# Patient Record
Sex: FEMALE | Race: WHITE | Employment: FULL TIME | URBAN - METROPOLITAN AREA
[De-identification: names, ages, dates, MRNs, and addresses within clinical notes are randomized per-mention and may not be internally consistent; named-entity substitution may affect disease eponyms.]

---

## 2018-12-07 PROBLEM — E22.1 HYPERPROLACTINEMIA (HCC): Status: ACTIVE | Noted: 2018-12-07

## 2019-08-28 PROBLEM — E66.01 OBESITY, MORBID (HCC): Status: ACTIVE | Noted: 2019-08-28

## 2021-04-08 ENCOUNTER — HOSPITAL ENCOUNTER (OUTPATIENT)
Dept: MAMMOGRAPHY | Age: 31
Discharge: HOME | End: 2021-04-08
Attending: OBSTETRICS & GYNECOLOGY
Payer: COMMERCIAL

## 2021-04-08 DIAGNOSIS — N63.21 MASS OF UPPER OUTER QUADRANT OF LEFT BREAST: ICD-10-CM

## 2021-04-08 PROCEDURE — 77066 DX MAMMO INCL CAD BI: CPT

## 2021-04-08 PROCEDURE — 76642 ULTRASOUND BREAST LIMITED: CPT

## 2021-07-12 ENCOUNTER — HOSPITAL ENCOUNTER (OUTPATIENT)
Dept: MAMMOGRAPHY | Age: 31
Discharge: HOME | End: 2021-07-12
Attending: OBSTETRICS & GYNECOLOGY
Payer: COMMERCIAL

## 2021-07-12 DIAGNOSIS — N63.21 MASS OF UPPER OUTER QUADRANT OF LEFT BREAST: ICD-10-CM

## 2021-07-12 PROCEDURE — 76642 ULTRASOUND BREAST LIMITED: CPT

## 2022-03-18 PROBLEM — E66.01 OBESITY, MORBID (HCC): Status: ACTIVE | Noted: 2019-08-28

## 2022-03-19 PROBLEM — E22.1 HYPERPROLACTINEMIA (HCC): Status: ACTIVE | Noted: 2018-12-07

## 2022-03-25 VITALS — BODY MASS INDEX: 38.41 KG/M2 | WEIGHT: 225 LBS | HEIGHT: 64 IN

## 2022-03-25 RX ORDER — PANTOPRAZOLE SODIUM 40 MG/1
40 TABLET, DELAYED RELEASE ORAL
COMMUNITY

## 2022-03-25 NOTE — PERIOP NOTES
Enhanced Recovery After GYN Surgery: non-diabetic patients    It is highly recommended you purchase and drink Ensure Complete - one bottle twice daily for five days starting on 04/09/2022. Ensure Complete is the preferred formula over other Ensure formulas. It is recommended that you continue drinking this for one month after surgery. The night before surgery 04/13/2022, drink 2 bottles of the Ensure Pre-Surgery drink. The morning of surgery 04/14/2022, drink one bottle of the Ensure Pre-Surgery drink while on your way to the hospital. Drink this over 5-10 minutes. Drink nothing else after drinking the pre-surgical drink the morning of surgery. Bring your patient handbook with you to the hospital.    Things to remember:    1. You will be given clear liquids to drink, advancing diet as tolerated    2. You will be up and moving around with assistance 2-4 hours after surgery. 3. You will be given regularly scheduled pain medications (NSAIDS, Tylenol, Gabapentin) with narcotics as needed. 4. You may be able to go home that night if the surgeon okays and you are up and eating and drinking. Otherwise, your discharge will be the following morning around lunch time. 5. Continue drinking Ensure Complete for 5 days after surgery.

## 2022-03-25 NOTE — PERIOP NOTES
Patient verified name and     Order for consent not found in EHR and matches case posting; patient verified. Type 2 surgery    Labs per surgeon: None; orders not received. Labs per anesthesia protocol: Hgb  EKG: Not required per anesthesia guidelines      Patient informed of GYN class on 2022 @ 0830 at which time labs will be drawn. Patient will also receive all patient education and hospital approved surgical skin cleanser to use per hospital policy. Patient instructed to hold all vitamins 7 days prior to surgery and NSAIDS 5 days prior to surgery, patient verbalized understanding. Patient instructed to continue previous medications as prescribed prior to surgery and to take the following medications the day of surgery according to anesthesia guidelines with a small sip of water: None. Patient answered medical/surgical history questions at their best of ability. All prior to admission medications documented in [facility].

## 2022-03-31 ENCOUNTER — HOSPITAL ENCOUNTER (OUTPATIENT)
Dept: SURGERY | Age: 32
Discharge: HOME | End: 2022-03-31

## 2022-04-05 NOTE — PROGRESS NOTES
Enhanced Recovery After GYN Surgery: non-diabetic patients    Drink Ensure COMPLETE - one bottle twice daily for five days starting on 4/8/22 and stopping on 4/12/22. Do not drink any Ensure COMPLETE the day before surgery 4/13/22. The night before surgery 4/13/22, drink 2 bottles of the Ensure Pre-Surgery drink. The morning of surgery 4/14/22, drink one bottle of the Ensure Pre-Surgery drink while on  your way to the hospital. Drink this over 5-10 minutes. Drink nothing else after drinking the pre-surgical drink the morning of surgery. Bring your patient handbook with you to the hospital.    Things to remember:    1. You will be given clear liquids to drink, advancing diet as tolerated    2. You will be up and moving around with assistance 2-4 hours after surgery. 3. You will be given regularly scheduled pain medications (NSAIDS, Tylenol, Gabapentin) with narcotics as needed. 4. You may be able to go home that night if the surgeon okays and you are up and eating and drinking. Otherwise, your discharge will be the following morning around lunch time. 5. Continue drinking Ensure COMPLETE for 5 days after surgery.

## 2022-04-06 ENCOUNTER — HOSPITAL ENCOUNTER (OUTPATIENT)
Dept: SURGERY | Age: 32
Discharge: HOME | End: 2022-04-06
Attending: OBSTETRICS & GYNECOLOGY
Payer: COMMERCIAL

## 2022-04-06 LAB — HGB BLD-MCNC: 13 G/DL (ref 11.7–15.4)

## 2022-04-06 PROCEDURE — 85018 HEMOGLOBIN: CPT

## 2022-04-06 PROCEDURE — 36415 COLL VENOUS BLD VENIPUNCTURE: CPT

## 2022-04-06 NOTE — PROGRESS NOTES
Patient attended [facility]/ GYN surgery orientation class today. Detailed instruction book regarding GYN surgery was provided at start of class. Class content included pre-operative instructions for surgery in the week prior to and day before surgery. Packet including Hibiclens and printed instructions on bathing was provided to patient. Detailed and printed diet instruction and presurgical drinks were also given to patient  in accordance with [facility] protocol. Detailed information was given regarding arriving at the hospital and instructions for the patient's day of surgery. Discussed recovery from surgery, hospital stay, pain management, and discharge. Reviewed recovery at home including pelvic rest, driving and activity restrictions, issues requiring call to physician etc. Payton Ramos all questions in detail. Patient voices understanding of all.

## 2022-04-13 ENCOUNTER — ANESTHESIA EVENT (OUTPATIENT)
Dept: SURGERY | Age: 32
End: 2022-04-13
Payer: COMMERCIAL

## 2022-04-14 ENCOUNTER — ANESTHESIA (OUTPATIENT)
Dept: SURGERY | Age: 32
End: 2022-04-14
Payer: COMMERCIAL

## 2022-04-14 ENCOUNTER — HOSPITAL ENCOUNTER (OUTPATIENT)
Age: 32
Discharge: HOME | End: 2022-04-14
Attending: OBSTETRICS & GYNECOLOGY | Admitting: OBSTETRICS & GYNECOLOGY
Payer: COMMERCIAL

## 2022-04-14 VITALS
HEART RATE: 86 BPM | BODY MASS INDEX: 39.61 KG/M2 | TEMPERATURE: 98.6 F | HEIGHT: 64 IN | DIASTOLIC BLOOD PRESSURE: 76 MMHG | WEIGHT: 232 LBS | RESPIRATION RATE: 16 BRPM | OXYGEN SATURATION: 95 % | SYSTOLIC BLOOD PRESSURE: 115 MMHG

## 2022-04-14 PROBLEM — E28.2 PCOS (POLYCYSTIC OVARIAN SYNDROME): Status: ACTIVE | Noted: 2022-04-14

## 2022-04-14 PROBLEM — R10.2 PELVIC PAIN: Status: ACTIVE | Noted: 2022-04-14

## 2022-04-14 LAB
ABO + RH BLD: NORMAL
BLOOD GROUP ANTIBODIES SERPL: NORMAL
HCG UR QL: NEGATIVE
SPECIMEN EXP DATE BLD: NORMAL

## 2022-04-14 PROCEDURE — 76010000171 HC OR TIME 2 TO 2.5 HR INTENSV-TIER 1: Performed by: OBSTETRICS & GYNECOLOGY

## 2022-04-14 PROCEDURE — 74011250636 HC RX REV CODE- 250/636: Performed by: OBSTETRICS & GYNECOLOGY

## 2022-04-14 PROCEDURE — 74011000250 HC RX REV CODE- 250: Performed by: OBSTETRICS & GYNECOLOGY

## 2022-04-14 PROCEDURE — 58552 LAPARO-VAG HYST INCL T/O: CPT | Performed by: OBSTETRICS & GYNECOLOGY

## 2022-04-14 PROCEDURE — 77030040830 HC CATH URETH FOL MDII -A: Performed by: OBSTETRICS & GYNECOLOGY

## 2022-04-14 PROCEDURE — 77030039425 HC BLD LARYNG TRULITE DISP TELE -A: Performed by: ANESTHESIOLOGY

## 2022-04-14 PROCEDURE — 77030008771 HC TU NG SALEM SUMP -A: Performed by: ANESTHESIOLOGY

## 2022-04-14 PROCEDURE — 81025 URINE PREGNANCY TEST: CPT

## 2022-04-14 PROCEDURE — 77030018836 HC SOL IRR NACL ICUM -A: Performed by: OBSTETRICS & GYNECOLOGY

## 2022-04-14 PROCEDURE — 74011250636 HC RX REV CODE- 250/636: Performed by: ANESTHESIOLOGY

## 2022-04-14 PROCEDURE — 77030039147 HC PWDR HEMSTS SURGICEL JNJ -D: Performed by: OBSTETRICS & GYNECOLOGY

## 2022-04-14 PROCEDURE — 86900 BLOOD TYPING SEROLOGIC ABO: CPT

## 2022-04-14 PROCEDURE — 74011250636 HC RX REV CODE- 250/636: Performed by: NURSE ANESTHETIST, CERTIFIED REGISTERED

## 2022-04-14 PROCEDURE — 77030040922 HC BLNKT HYPOTHRM STRY -A: Performed by: ANESTHESIOLOGY

## 2022-04-14 PROCEDURE — 77030020829: Performed by: OBSTETRICS & GYNECOLOGY

## 2022-04-14 PROCEDURE — 77030040361 HC SLV COMPR DVT MDII -B: Performed by: OBSTETRICS & GYNECOLOGY

## 2022-04-14 PROCEDURE — 77030018720 HC DVC LIGSR ATLS COVD -E: Performed by: OBSTETRICS & GYNECOLOGY

## 2022-04-14 PROCEDURE — 77030003578 HC NDL INSUF VERES AMR -B: Performed by: OBSTETRICS & GYNECOLOGY

## 2022-04-14 PROCEDURE — 76060000035 HC ANESTHESIA 2 TO 2.5 HR: Performed by: OBSTETRICS & GYNECOLOGY

## 2022-04-14 PROCEDURE — 77030008756 HC TU IRR SUC STRY -B: Performed by: OBSTETRICS & GYNECOLOGY

## 2022-04-14 PROCEDURE — 77030019908 HC STETH ESOPH SIMS -A: Performed by: ANESTHESIOLOGY

## 2022-04-14 PROCEDURE — 77030003029 HC SUT VCRL J&J -B: Performed by: OBSTETRICS & GYNECOLOGY

## 2022-04-14 PROCEDURE — 74011000250 HC RX REV CODE- 250: Performed by: NURSE ANESTHETIST, CERTIFIED REGISTERED

## 2022-04-14 PROCEDURE — 74011250637 HC RX REV CODE- 250/637: Performed by: ANESTHESIOLOGY

## 2022-04-14 PROCEDURE — 77030008606 HC TRCR ENDOSC KII AMR -B: Performed by: OBSTETRICS & GYNECOLOGY

## 2022-04-14 PROCEDURE — 77030037088 HC TUBE ENDOTRACH ORAL NSL COVD-A: Performed by: ANESTHESIOLOGY

## 2022-04-14 PROCEDURE — 77030002933 HC SUT MCRYL J&J -A: Performed by: OBSTETRICS & GYNECOLOGY

## 2022-04-14 PROCEDURE — 76210000006 HC OR PH I REC 0.5 TO 1 HR: Performed by: OBSTETRICS & GYNECOLOGY

## 2022-04-14 PROCEDURE — 74011250637 HC RX REV CODE- 250/637: Performed by: OBSTETRICS & GYNECOLOGY

## 2022-04-14 PROCEDURE — 77030010507 HC ADH SKN DERMBND J&J -B: Performed by: OBSTETRICS & GYNECOLOGY

## 2022-04-14 PROCEDURE — 2709999900 HC NON-CHARGEABLE SUPPLY: Performed by: OBSTETRICS & GYNECOLOGY

## 2022-04-14 PROCEDURE — 77030012770 HC TRCR OPT FX AMR -B: Performed by: OBSTETRICS & GYNECOLOGY

## 2022-04-14 PROCEDURE — 88307 TISSUE EXAM BY PATHOLOGIST: CPT

## 2022-04-14 PROCEDURE — 77030011502 HC MANIP UTER ZUM ZINN -B: Performed by: OBSTETRICS & GYNECOLOGY

## 2022-04-14 PROCEDURE — 77030031139 HC SUT VCRL2 J&J -A: Performed by: OBSTETRICS & GYNECOLOGY

## 2022-04-14 RX ORDER — KETOROLAC TROMETHAMINE 30 MG/ML
30 INJECTION, SOLUTION INTRAMUSCULAR; INTRAVENOUS
Status: DISCONTINUED | OUTPATIENT
Start: 2022-04-14 | End: 2022-04-14 | Stop reason: HOSPADM

## 2022-04-14 RX ORDER — CEFAZOLIN SODIUM/WATER 2 G/20 ML
2 SYRINGE (ML) INTRAVENOUS ONCE
Status: COMPLETED | OUTPATIENT
Start: 2022-04-14 | End: 2022-04-14

## 2022-04-14 RX ORDER — BUPIVACAINE HYDROCHLORIDE 2.5 MG/ML
INJECTION, SOLUTION EPIDURAL; INFILTRATION; INTRACAUDAL AS NEEDED
Status: DISCONTINUED | OUTPATIENT
Start: 2022-04-14 | End: 2022-04-14 | Stop reason: HOSPADM

## 2022-04-14 RX ORDER — DIPHENHYDRAMINE HYDROCHLORIDE 50 MG/ML
12.5 INJECTION, SOLUTION INTRAMUSCULAR; INTRAVENOUS ONCE
Status: DISCONTINUED | OUTPATIENT
Start: 2022-04-14 | End: 2022-04-14 | Stop reason: HOSPADM

## 2022-04-14 RX ORDER — SODIUM CHLORIDE, SODIUM LACTATE, POTASSIUM CHLORIDE, CALCIUM CHLORIDE 600; 310; 30; 20 MG/100ML; MG/100ML; MG/100ML; MG/100ML
100 INJECTION, SOLUTION INTRAVENOUS CONTINUOUS
Status: DISCONTINUED | OUTPATIENT
Start: 2022-04-14 | End: 2022-04-14 | Stop reason: HOSPADM

## 2022-04-14 RX ORDER — ONDANSETRON 4 MG/1
4 TABLET, ORALLY DISINTEGRATING ORAL
Status: DISCONTINUED | OUTPATIENT
Start: 2022-04-14 | End: 2022-04-14 | Stop reason: HOSPADM

## 2022-04-14 RX ORDER — LIDOCAINE HYDROCHLORIDE 20 MG/ML
INJECTION, SOLUTION EPIDURAL; INFILTRATION; INTRACAUDAL; PERINEURAL AS NEEDED
Status: DISCONTINUED | OUTPATIENT
Start: 2022-04-14 | End: 2022-04-14 | Stop reason: HOSPADM

## 2022-04-14 RX ORDER — KETAMINE HYDROCHLORIDE 50 MG/ML
INJECTION, SOLUTION INTRAMUSCULAR; INTRAVENOUS AS NEEDED
Status: DISCONTINUED | OUTPATIENT
Start: 2022-04-14 | End: 2022-04-14 | Stop reason: HOSPADM

## 2022-04-14 RX ORDER — SODIUM CHLORIDE 0.9 % (FLUSH) 0.9 %
5-40 SYRINGE (ML) INJECTION EVERY 8 HOURS
Status: DISCONTINUED | OUTPATIENT
Start: 2022-04-14 | End: 2022-04-14 | Stop reason: HOSPADM

## 2022-04-14 RX ORDER — ESMOLOL HYDROCHLORIDE 10 MG/ML
INJECTION INTRAVENOUS AS NEEDED
Status: DISCONTINUED | OUTPATIENT
Start: 2022-04-14 | End: 2022-04-14 | Stop reason: HOSPADM

## 2022-04-14 RX ORDER — FENTANYL CITRATE 50 UG/ML
INJECTION, SOLUTION INTRAMUSCULAR; INTRAVENOUS AS NEEDED
Status: DISCONTINUED | OUTPATIENT
Start: 2022-04-14 | End: 2022-04-14 | Stop reason: HOSPADM

## 2022-04-14 RX ORDER — ONDANSETRON 2 MG/ML
4 INJECTION INTRAMUSCULAR; INTRAVENOUS ONCE
Status: COMPLETED | OUTPATIENT
Start: 2022-04-14 | End: 2022-04-14

## 2022-04-14 RX ORDER — MIDAZOLAM HYDROCHLORIDE 1 MG/ML
2 INJECTION, SOLUTION INTRAMUSCULAR; INTRAVENOUS
Status: COMPLETED | OUTPATIENT
Start: 2022-04-14 | End: 2022-04-14

## 2022-04-14 RX ORDER — NALOXONE HYDROCHLORIDE 0.4 MG/ML
0.1 INJECTION, SOLUTION INTRAMUSCULAR; INTRAVENOUS; SUBCUTANEOUS AS NEEDED
Status: DISCONTINUED | OUTPATIENT
Start: 2022-04-14 | End: 2022-04-14 | Stop reason: HOSPADM

## 2022-04-14 RX ORDER — DEXAMETHASONE SODIUM PHOSPHATE 4 MG/ML
INJECTION, SOLUTION INTRA-ARTICULAR; INTRALESIONAL; INTRAMUSCULAR; INTRAVENOUS; SOFT TISSUE AS NEEDED
Status: DISCONTINUED | OUTPATIENT
Start: 2022-04-14 | End: 2022-04-14 | Stop reason: HOSPADM

## 2022-04-14 RX ORDER — GLYCOPYRROLATE 0.2 MG/ML
INJECTION INTRAMUSCULAR; INTRAVENOUS AS NEEDED
Status: DISCONTINUED | OUTPATIENT
Start: 2022-04-14 | End: 2022-04-14 | Stop reason: HOSPADM

## 2022-04-14 RX ORDER — ONDANSETRON 2 MG/ML
INJECTION INTRAMUSCULAR; INTRAVENOUS AS NEEDED
Status: DISCONTINUED | OUTPATIENT
Start: 2022-04-14 | End: 2022-04-14 | Stop reason: HOSPADM

## 2022-04-14 RX ORDER — FENTANYL CITRATE 50 UG/ML
100 INJECTION, SOLUTION INTRAMUSCULAR; INTRAVENOUS ONCE
Status: DISCONTINUED | OUTPATIENT
Start: 2022-04-14 | End: 2022-04-14 | Stop reason: HOSPADM

## 2022-04-14 RX ORDER — OXYCODONE HYDROCHLORIDE 5 MG/1
10 TABLET ORAL
Status: DISCONTINUED | OUTPATIENT
Start: 2022-04-14 | End: 2022-04-14 | Stop reason: HOSPADM

## 2022-04-14 RX ORDER — ACETAMINOPHEN 500 MG
1000 TABLET ORAL ONCE
Status: COMPLETED | OUTPATIENT
Start: 2022-04-14 | End: 2022-04-14

## 2022-04-14 RX ORDER — DOCUSATE SODIUM 100 MG/1
100 CAPSULE, LIQUID FILLED ORAL 2 TIMES DAILY
Status: DISCONTINUED | OUTPATIENT
Start: 2022-04-14 | End: 2022-04-14 | Stop reason: HOSPADM

## 2022-04-14 RX ORDER — ONDANSETRON 2 MG/ML
4 INJECTION INTRAMUSCULAR; INTRAVENOUS ONCE
Status: DISCONTINUED | OUTPATIENT
Start: 2022-04-14 | End: 2022-04-14 | Stop reason: HOSPADM

## 2022-04-14 RX ORDER — NALOXONE HYDROCHLORIDE 0.4 MG/ML
0.4 INJECTION, SOLUTION INTRAMUSCULAR; INTRAVENOUS; SUBCUTANEOUS AS NEEDED
Status: DISCONTINUED | OUTPATIENT
Start: 2022-04-14 | End: 2022-04-14 | Stop reason: HOSPADM

## 2022-04-14 RX ORDER — OXYCODONE HYDROCHLORIDE 5 MG/1
5 TABLET ORAL
Status: DISCONTINUED | OUTPATIENT
Start: 2022-04-14 | End: 2022-04-14 | Stop reason: HOSPADM

## 2022-04-14 RX ORDER — ROCURONIUM BROMIDE 10 MG/ML
INJECTION, SOLUTION INTRAVENOUS AS NEEDED
Status: DISCONTINUED | OUTPATIENT
Start: 2022-04-14 | End: 2022-04-14 | Stop reason: HOSPADM

## 2022-04-14 RX ORDER — NEOSTIGMINE METHYLSULFATE 1 MG/ML
INJECTION, SOLUTION INTRAVENOUS AS NEEDED
Status: DISCONTINUED | OUTPATIENT
Start: 2022-04-14 | End: 2022-04-14 | Stop reason: HOSPADM

## 2022-04-14 RX ORDER — EPHEDRINE SULFATE/0.9% NACL/PF 50 MG/5 ML
SYRINGE (ML) INTRAVENOUS AS NEEDED
Status: DISCONTINUED | OUTPATIENT
Start: 2022-04-14 | End: 2022-04-14 | Stop reason: HOSPADM

## 2022-04-14 RX ORDER — SODIUM CHLORIDE, SODIUM LACTATE, POTASSIUM CHLORIDE, CALCIUM CHLORIDE 600; 310; 30; 20 MG/100ML; MG/100ML; MG/100ML; MG/100ML
75 INJECTION, SOLUTION INTRAVENOUS CONTINUOUS
Status: DISCONTINUED | OUTPATIENT
Start: 2022-04-14 | End: 2022-04-14 | Stop reason: HOSPADM

## 2022-04-14 RX ORDER — HALOPERIDOL 5 MG/ML
1 INJECTION INTRAMUSCULAR ONCE
Status: COMPLETED | OUTPATIENT
Start: 2022-04-14 | End: 2022-04-14

## 2022-04-14 RX ORDER — HALOPERIDOL 5 MG/ML
INJECTION INTRAMUSCULAR
Status: DISCONTINUED
Start: 2022-04-14 | End: 2022-04-14 | Stop reason: HOSPADM

## 2022-04-14 RX ORDER — MIDAZOLAM HYDROCHLORIDE 1 MG/ML
2 INJECTION, SOLUTION INTRAMUSCULAR; INTRAVENOUS ONCE
Status: DISCONTINUED | OUTPATIENT
Start: 2022-04-14 | End: 2022-04-14 | Stop reason: HOSPADM

## 2022-04-14 RX ORDER — KETOROLAC TROMETHAMINE 30 MG/ML
INJECTION, SOLUTION INTRAMUSCULAR; INTRAVENOUS AS NEEDED
Status: DISCONTINUED | OUTPATIENT
Start: 2022-04-14 | End: 2022-04-14 | Stop reason: HOSPADM

## 2022-04-14 RX ORDER — SODIUM CHLORIDE 0.9 % (FLUSH) 0.9 %
5-40 SYRINGE (ML) INJECTION AS NEEDED
Status: DISCONTINUED | OUTPATIENT
Start: 2022-04-14 | End: 2022-04-14 | Stop reason: HOSPADM

## 2022-04-14 RX ORDER — LIDOCAINE HYDROCHLORIDE 10 MG/ML
0.1 INJECTION INFILTRATION; PERINEURAL AS NEEDED
Status: DISCONTINUED | OUTPATIENT
Start: 2022-04-14 | End: 2022-04-14 | Stop reason: HOSPADM

## 2022-04-14 RX ORDER — HYDROMORPHONE HYDROCHLORIDE 2 MG/ML
0.5 INJECTION, SOLUTION INTRAMUSCULAR; INTRAVENOUS; SUBCUTANEOUS
Status: DISCONTINUED | OUTPATIENT
Start: 2022-04-14 | End: 2022-04-14 | Stop reason: HOSPADM

## 2022-04-14 RX ORDER — PROPOFOL 10 MG/ML
INJECTION, EMULSION INTRAVENOUS AS NEEDED
Status: DISCONTINUED | OUTPATIENT
Start: 2022-04-14 | End: 2022-04-14 | Stop reason: HOSPADM

## 2022-04-14 RX ORDER — PHENYLEPHRINE HYDROCHLORIDE 10 MG/ML
INJECTION INTRAVENOUS AS NEEDED
Status: DISCONTINUED | OUTPATIENT
Start: 2022-04-14 | End: 2022-04-14 | Stop reason: HOSPADM

## 2022-04-14 RX ADMIN — DOCUSATE SODIUM 100 MG: 100 CAPSULE ORAL at 17:36

## 2022-04-14 RX ADMIN — PROPOFOL 200 MG: 10 INJECTION, EMULSION INTRAVENOUS at 09:51

## 2022-04-14 RX ADMIN — GLYCOPYRROLATE 0.1 MG: 0.2 INJECTION, SOLUTION INTRAMUSCULAR; INTRAVENOUS at 10:27

## 2022-04-14 RX ADMIN — GLYCOPYRROLATE 0.6 MG: 0.2 INJECTION, SOLUTION INTRAMUSCULAR; INTRAVENOUS at 11:32

## 2022-04-14 RX ADMIN — Medication 10 MG: at 10:30

## 2022-04-14 RX ADMIN — Medication 4 MG: at 11:32

## 2022-04-14 RX ADMIN — ESMOLOL HYDROCHLORIDE 15 MG: 10 INJECTION, SOLUTION INTRAVENOUS at 11:46

## 2022-04-14 RX ADMIN — SODIUM CHLORIDE, SODIUM LACTATE, POTASSIUM CHLORIDE, AND CALCIUM CHLORIDE: 600; 310; 30; 20 INJECTION, SOLUTION INTRAVENOUS at 11:00

## 2022-04-14 RX ADMIN — KETOROLAC TROMETHAMINE 30 MG: 30 INJECTION, SOLUTION INTRAMUSCULAR; INTRAVENOUS at 11:29

## 2022-04-14 RX ADMIN — FENTANYL CITRATE 50 MCG: 50 INJECTION INTRAMUSCULAR; INTRAVENOUS at 10:46

## 2022-04-14 RX ADMIN — ACETAMINOPHEN 1000 MG: 500 TABLET, FILM COATED ORAL at 08:18

## 2022-04-14 RX ADMIN — SODIUM CHLORIDE, SODIUM LACTATE, POTASSIUM CHLORIDE, AND CALCIUM CHLORIDE 100 ML/HR: 600; 310; 30; 20 INJECTION, SOLUTION INTRAVENOUS at 08:31

## 2022-04-14 RX ADMIN — ROCURONIUM BROMIDE 5 MG: 10 INJECTION, SOLUTION INTRAVENOUS at 11:16

## 2022-04-14 RX ADMIN — MIDAZOLAM 2 MG: 1 INJECTION INTRAMUSCULAR; INTRAVENOUS at 09:05

## 2022-04-14 RX ADMIN — KETAMINE HYDROCHLORIDE 50 MG: 50 INJECTION, SOLUTION INTRAMUSCULAR; INTRAVENOUS at 10:16

## 2022-04-14 RX ADMIN — ROCURONIUM BROMIDE 15 MG: 10 INJECTION, SOLUTION INTRAVENOUS at 10:31

## 2022-04-14 RX ADMIN — KETAMINE HYDROCHLORIDE 10 MG: 50 INJECTION, SOLUTION INTRAMUSCULAR; INTRAVENOUS at 11:15

## 2022-04-14 RX ADMIN — FENTANYL CITRATE 50 MCG: 50 INJECTION INTRAMUSCULAR; INTRAVENOUS at 10:38

## 2022-04-14 RX ADMIN — DEXAMETHASONE SODIUM PHOSPHATE 6 MG: 4 INJECTION, SOLUTION INTRAMUSCULAR; INTRAVENOUS at 10:17

## 2022-04-14 RX ADMIN — GLYCOPYRROLATE 0.1 MG: 0.2 INJECTION, SOLUTION INTRAMUSCULAR; INTRAVENOUS at 10:17

## 2022-04-14 RX ADMIN — SODIUM CHLORIDE, PRESERVATIVE FREE 10 ML: 5 INJECTION INTRAVENOUS at 15:00

## 2022-04-14 RX ADMIN — OXYCODONE 5 MG: 5 TABLET ORAL at 14:26

## 2022-04-14 RX ADMIN — HALOPERIDOL LACTATE 1 MG: 5 INJECTION, SOLUTION INTRAMUSCULAR at 12:15

## 2022-04-14 RX ADMIN — LIDOCAINE HYDROCHLORIDE 60 MG: 20 INJECTION, SOLUTION EPIDURAL; INFILTRATION; INTRACAUDAL; PERINEURAL at 09:51

## 2022-04-14 RX ADMIN — ROCURONIUM BROMIDE 50 MG: 10 INJECTION, SOLUTION INTRAVENOUS at 09:51

## 2022-04-14 RX ADMIN — ONDANSETRON 4 MG: 2 INJECTION INTRAMUSCULAR; INTRAVENOUS at 12:12

## 2022-04-14 RX ADMIN — Medication 2 G: at 10:06

## 2022-04-14 RX ADMIN — ONDANSETRON 4 MG: 2 INJECTION INTRAMUSCULAR; INTRAVENOUS at 11:29

## 2022-04-14 RX ADMIN — FENTANYL CITRATE 100 MCG: 50 INJECTION INTRAMUSCULAR; INTRAVENOUS at 09:51

## 2022-04-14 NOTE — ANESTHESIA PREPROCEDURE EVALUATION
Relevant Problems   NEUROLOGY   (+) Anxiety disorder      ENDOCRINE   (+) Obesity, morbid (HCC)       Anesthetic History   No history of anesthetic complications            Review of Systems / Medical History  Patient summary reviewed and pertinent labs reviewed    Pulmonary  Within defined limits                 Neuro/Psych   Within defined limits           Cardiovascular                  Exercise tolerance: >4 METS     GI/Hepatic/Renal     GERD           Endo/Other        Morbid obesity     Other Findings   Comments: PCOS           Physical Exam    Airway  Mallampati: II  TM Distance: 4 - 6 cm  Neck ROM: normal range of motion   Mouth opening: Normal     Cardiovascular    Rhythm: regular  Rate: normal         Dental  No notable dental hx       Pulmonary  Breath sounds clear to auscultation               Abdominal  GI exam deferred       Other Findings            Anesthetic Plan    ASA: 2  Anesthesia type: general          Induction: Intravenous  Anesthetic plan and risks discussed with: Patient

## 2022-04-14 NOTE — ROUTINE PROCESS
TRANSFER - IN REPORT:    [facility] being received from PACU for routine progression of care. Report consisted of patients Situation, Background, Assessment and Recommendations(SBAR). Information from the following report(s) SBAR, OR Summary and Procedure Summary was reviewed. Opportunity for questions and clarification was provided. Assessment completed upon patients arrival to unit and care assumed. Patient received to room 340. Patient assessment completed. Plan of care reviewed. Patient oriented to room and call light. Patient aware to use call light to communicate any chest pain or needs. Admission skin assessment completed with second RN, Calvin Martinez and reveals 3 sx sites clean/dry/intact with surgical glue. Otherwise, skin is intact.

## 2022-04-14 NOTE — PROGRESS NOTES
04/14/22 1300   Dual Skin Pressure Injury Assessment   Dual Skin Pressure Injury Assessment X   Second Care Provider (Based on [address] [place] [address]) Christen Dougherty, RN   Skin Integumentary   Skin Integumentary (WDL) X    Pressure  Injury Documentation No Pressure Injury Noted-Pressure Ulcer Prevention Initiated   Skin Integrity Incision (comment)  (3 lap sites to abdomen )   Wound Prevention and Protection Methods   Orientation of Wound Prevention Posterior   Location of Wound Prevention Sacrum/Coccyx   Dressing Present  No   Wound Offloading (Prevention Methods) Pillows;Repositioning;Turning     Admission skin assessment completed with second RN, Christen Dougherty and reveals 3 lap surgical sites clean/dry/intact with surgical glue. Otherwise, skin is intact.

## 2022-04-14 NOTE — PERIOP NOTES
Patient awakening, c/o nausea. wretching noted. Call placed to dr. Martha You. Orders received for additional dose of zofran and haldol to be given. See MAR.

## 2022-04-14 NOTE — OP NOTES
Full Operative Report        Patient: Bryce Pereyra  MRN: [id number]  Date: 04/14/22        PreOp Diagnosis:   1. Pelvic pain  2. Dysmenorrhea  3. Irregular menses  4. PCOS  5. History of LEEP  6. History of cervical dysplasia      Post Op Diagnosis: same     Procedure performed: Laparoscopic-Assisted Vaginal Hysterectomy, Bilateral salpingectomy, Cystoscopy     Surgeon: Travis Tobar MD     Assistant: Kory Solomon CST     Indications: 34yo female with longstanding history of chronic pelvic pain, and irregular periods. Her pelvic pain has been worsening and her irregular periods have been more erratic despite oral contraceptives. She has developed dysmenorrhea that has been very painful and distressing to her. She has had recurrent abnormal pap smears and cervical dysplasia requiring LEEP procedure in the past. These issues have been unrelieved / recurrent despite conservative management. Pt desires to proceed with definitive surgical management with hysterectomy. Informed consent was obtained and all alternatives to procedure and route of procedure were reviewed.      Anesthesia: General     EBL: 100     UOP: 150 via rincon catheter that was removed at the end of the procedure.     Findings: Normal external female genitalia, normal cervix. Normal uterus. Bilateral ovaries noted to be normal in appearance and bilateral tubes were noted to be normal appearing as well. No abnormalities of the pelvis were noted. Ureters were noted to have the expected usual pelvic course. Bladder was intact with efflux noted from BL ureteral orifices. A normal appendix was noted.      Specimens: Uterus with bilateral fallopian tubes     Procedure in detail:                 Informed consent was obtained. The patient was taken to the operating room and placed under general anesthesia. When general anesthesia was found to be adequate, the patient was prepped and draped in normal sterile fashion.  A surgical time out was performed according to protocol. Attention was then turned to the patient's vagina. A Nazario catheter was placed into the urethra. A weighted speculum was placed in the posterior vagina. The anterior lip of the cervix was grasped with a single-tooth tenaculum. The uterus was sounded to 9 cm. Levander Lemon was placed for means of uterine manipulation. All other instruments were then removed from the vagina. Attention was then turned to the patient's abdomen. A 5 mm skin incision was made infraumbilically after injecting with marcaine. The Veress needle was then inserted. Intraperitoneal placement was confirmed with a water-filled syringe and a drop in cO2 pressure with insufflation. Once the abdomen was insufflated a 5mm port was inserted under direct visualization using an Optiscope. Intraperitoneal placement was confirmed again with the scope. Two 11 mm skin incisions were made in the right lower quadrant and left lower quadrant under direct visualization after injecting with marcaine. Hemostasis was assured throughout. Survey of the patient's abdomen revealed findings stated above. The uterus was normal in size. Left ovary appeared normal. Right ovary appeared normal. Bilateral fallopian tubes each appeared normal as well. Ureters were noted to follow the usual anatomic course bilaterally. The left fallopian tube was taken down with the ligasure and the left uteroovarian ligament was transected and ligated with the ligasure. The right tube was taken down with the ligasure and the right uteroovarian ligament was transected and ligated with the ligasure. Bilateral fallopian tubes were removed and placed in specimen container. The round ligaments were then also cauterized and transected with good hemostasis. The uterine arteries were then skeletonized bilaterally. The bladder flap was then created with blunt and sharp dissection. The bladder was then gently pushed down and off of the lower uterine segment.  The uterine arteries were then cauterized bilaterally and transected. Hemostasis was seen throughout. Attention was then turned back to the patient's vagina where a weighted speculum was placed and the uterine manipulator was removed. A double toothed tenaculum was placed on the anterior and posterior lips of the cervix and the cervix was injected with a solution of neosynephrine. Bryant retractors were used to retract anteriorly and laterally. A scalpel was used to incise the cervix circumferentially and the mucosa was dissected bluntly. A posterior colpotomy was made with the nj scissors and a suture of 0-vicryl was placed to tag the posterior colpotomy to the posterior vaginal mucosa in the midline. The weighted speculum was replaced with a long-weighted speculum and a curved heany clamp was used to clamp the uterosacral ligaments bilaterally. They were then transected and suture ligated with 0-vicryl sutures which were tagged with hemostats. The anterior colpotomy was then made after blunt and sharp dissection with metzenbaum scissors. The bryant was replaced to protect the bladder. The ligasure was used to take down remaining pedicles bilaterally and the uterus was removed and placed in specimen container with the fallopian tubes. The operating field was inspected and found to have hemostasis. The posterior peritoneum was transfixed to the posterior vaginal mucosa with a 0-vicryl in a running locked fashion. Bilateral uterosacrals were incorporated for a modified Kent's culdoplasty. The vaginal cuff was closed with interrupted figure of eights of 0-vicryl suture in an anterior to posterior fashion. Hemostasis was again assured. The rincon catheter was removed and a cystoscopy was then performed. There was no trauma seen in the bladder or urethra. Efflux was noted from bilateral ureteral orifices. The cystoscope was removed and the Rincon catheter replaced. The abdomen was insufflated and inspected and hemostasis was noted. Surgicel powder was placed over the vaginal cuff for prophylaxis. Hemostasis was assured and confirmed during low-pressure environment during desufflation. The RLQ and LLQ trocars were then removed and hemostasis was noted. The infraumbilical trocar was removed and the incisions were closed with a 4-0 vicryl and dermabond was used on top of the skin incisions. Patient tolerated the procedure well. Sponge, lap, and needle counts were correct x2.  The patient was taken to recovery in stable condition.     Complications: none        MD Stefani Wilcox Forget

## 2022-04-14 NOTE — ROUTINE PROCESS
Pt has been discharged. Has been ambulating frequently in the hallway, able to take PO meds along with tolerating food well also, urinated twice and is passing flatus. Pain is currently at a 4/10. Went over discharge paperwork with patient who stated she has no questions. Pt has all discharge medications already. IV has been taken out. Lap sites are clean/dry/intact. Wheeled patient downstairs.

## 2022-04-14 NOTE — PERIOP NOTES
TRANSFER - OUT REPORT:    Verbal report given to VIVIAN Briones on [employer]  being transferred to [phone] for routine progression of care       Report consisted of patients Situation, Background, Assessment and   Recommendations(SBAR). Information from the following report(s) SBAR, OR Summary, Procedure Summary, Intake/Output, Cardiac Rhythm sinus rhythm and Procedure Verification was reviewed with the receiving nurse. Lines:   Peripheral IV 04/14/22 Left;Posterior Hand (Active)   Site Assessment Clean, dry, & intact 04/14/22 1227   Phlebitis Assessment 0 04/14/22 1227   Infiltration Assessment 0 04/14/22 1227   Dressing Status Clean, dry, & intact 04/14/22 1227   Dressing Type Tape;Transparent 04/14/22 1227   Hub Color/Line Status Green 04/14/22 1227        Opportunity for questions and clarification was provided.       Patient transported with:   O2 @ 4 liters  Tech

## 2022-04-14 NOTE — ANESTHESIA POSTPROCEDURE EVALUATION
Procedure(s):  ERAS/ HYSTERECTOMY VAGINAL ASSISTED LAPAROSCOPIC (LAVH) BILATERAL SALPINGECTOMY  CYSTOSCOPY.     general    Anesthesia Post Evaluation      Multimodal analgesia: multimodal analgesia used between 6 hours prior to anesthesia start to PACU discharge  Patient location during evaluation: bedside  Patient participation: complete - patient participated  Level of consciousness: responsive to verbal stimuli  Pain management: adequate  Airway patency: patent  Anesthetic complications: no  Cardiovascular status: hemodynamically stable  Respiratory status: spontaneous ventilation  Hydration status: stable    Final Post Anesthesia Temperature Assessment:  Normothermia (36.0-37.5 degrees C)      INITIAL Post-op Vital signs:   Vitals Value Taken Time   /84 04/14/22 1152   Temp 37.2 °C (99 °F) 04/14/22 1152   Pulse 82 04/14/22 1152   Resp 17 04/14/22 1152   SpO2 97 % 04/14/22 1152

## 2022-04-20 NOTE — PROGRESS NOTES
4-20-22    [facility]    POD 6    Post Discharge Phone Call    Patient states pain is mild cramping and controlled with Ibuprofen. Tolerating regular diet without any c/o n/v at present. Consumed Ensure Complete 2 per day for 5 days pre and post op surgery. Having daily BM. States  abdominal incisions are C/D/I. No bleeding. Follow up with Dr. Nika Colin on 4-29-22.

## 2022-05-25 ENCOUNTER — OFFICE VISIT (OUTPATIENT)
Dept: OBGYN CLINIC | Age: 32
End: 2022-05-25

## 2022-05-25 VITALS
WEIGHT: 219 LBS | DIASTOLIC BLOOD PRESSURE: 80 MMHG | HEIGHT: 64 IN | BODY MASS INDEX: 37.39 KG/M2 | SYSTOLIC BLOOD PRESSURE: 120 MMHG

## 2022-05-25 DIAGNOSIS — Z09 POSTOP CHECK: Primary | ICD-10-CM

## 2022-05-25 PROCEDURE — 99024 POSTOP FOLLOW-UP VISIT: CPT | Performed by: OBSTETRICS & GYNECOLOGY

## 2022-05-25 NOTE — PROGRESS NOTES
CC:  Postop follow-up      HPI:  Marybel Farnsworth presents for postop visit s/p LAVH, BS & cystoscopy    Pathology:  benign    Patient doing well postop without significant complaints. Voiding without difficulty. Tolerating regular diet w/out nausea/vomiting; +flatus and bms. Pain controlled with Ibuprofen only. No VB. Ambulating well. No issues today. PE:  /80   Ht 5' 4\" (1.626 m)   Wt 219 lb (99.3 kg)   Breastfeeding No   BMI 37.59 kg/m²       Constitutional: She appears well-developed and well-nourished. No distress.    HENT: Head: Normocephalic and atraumatic.    Cardiovascular: RRR  Pulmonary/Chest: CTAB  Abd: S/NTTP/ND, BS normoactive, Incision c/d/i x3, no erythema/induration  : Vaginal cuff intact, NTTP, no granulation tissue, well supported  Ext: No c/c/e    A/P:  Stable Post op condition      Pt is released from all post op restrictions. Indications for FU reviewed. Cleared to return to work Friday, 5/27/22 without restrictions.      MD Florentin Hodges

## 2022-05-25 NOTE — LETTER
[address] [address]  Phone: [phone]  Fax: [phone]           Marce Reardon MD      May 27, 2022     Patient: Vickey Davidson   MR Number: [id number]   YOB: 1990   Date of Visit: 5/25/2022       To Whom It May Concern,      Valeriy Machado is cleared to return to work as of 5/27/22 without restrictions.           Sincerely,        Marce Reardon MD    CC providers:  Vickey Davidson  [facility] [address] [id number]  Via Print Locally

## 2022-06-06 ENCOUNTER — APPOINTMENT (RX ONLY)
Dept: URBAN - METROPOLITAN AREA CLINIC 23 | Facility: CLINIC | Age: 32
Setting detail: DERMATOLOGY
End: 2022-06-06

## 2022-06-06 DIAGNOSIS — L82.1 OTHER SEBORRHEIC KERATOSIS: ICD-10-CM

## 2022-06-06 DIAGNOSIS — L81.4 OTHER MELANIN HYPERPIGMENTATION: ICD-10-CM

## 2022-06-06 DIAGNOSIS — L90.5 SCAR CONDITIONS AND FIBROSIS OF SKIN: ICD-10-CM

## 2022-06-06 DIAGNOSIS — Z71.89 OTHER SPECIFIED COUNSELING: ICD-10-CM

## 2022-06-06 DIAGNOSIS — D22 MELANOCYTIC NEVI: ICD-10-CM

## 2022-06-06 DIAGNOSIS — Z80.8 FAMILY HISTORY OF MALIGNANT NEOPLASM OF OTHER ORGANS OR SYSTEMS: ICD-10-CM

## 2022-06-06 PROBLEM — D22.71 MELANOCYTIC NEVI OF RIGHT LOWER LIMB, INCLUDING HIP: Status: ACTIVE | Noted: 2022-06-06

## 2022-06-06 PROBLEM — D22.72 MELANOCYTIC NEVI OF LEFT LOWER LIMB, INCLUDING HIP: Status: ACTIVE | Noted: 2022-06-06

## 2022-06-06 PROBLEM — D22.5 MELANOCYTIC NEVI OF TRUNK: Status: ACTIVE | Noted: 2022-06-06

## 2022-06-06 PROBLEM — D22.61 MELANOCYTIC NEVI OF RIGHT UPPER LIMB, INCLUDING SHOULDER: Status: ACTIVE | Noted: 2022-06-06

## 2022-06-06 PROBLEM — D22.62 MELANOCYTIC NEVI OF LEFT UPPER LIMB, INCLUDING SHOULDER: Status: ACTIVE | Noted: 2022-06-06

## 2022-06-06 PROCEDURE — 99203 OFFICE O/P NEW LOW 30 MIN: CPT

## 2022-06-06 PROCEDURE — ? OTHER

## 2022-06-06 PROCEDURE — ? COUNSELING

## 2022-06-06 ASSESSMENT — LOCATION SIMPLE DESCRIPTION DERM
LOCATION SIMPLE: RIGHT POSTERIOR UPPER ARM
LOCATION SIMPLE: LEFT POSTERIOR UPPER ARM
LOCATION SIMPLE: LEFT UPPER BACK
LOCATION SIMPLE: RIGHT THIGH
LOCATION SIMPLE: RIGHT UPPER BACK
LOCATION SIMPLE: ABDOMEN
LOCATION SIMPLE: RIGHT FOOT
LOCATION SIMPLE: RIGHT FOREARM
LOCATION SIMPLE: LEFT BREAST
LOCATION SIMPLE: LEFT FOREARM
LOCATION SIMPLE: LEFT CHEEK
LOCATION SIMPLE: CHEST
LOCATION SIMPLE: GROIN
LOCATION SIMPLE: LEFT THIGH
LOCATION SIMPLE: RIGHT CHEEK
LOCATION SIMPLE: LEFT FOREHEAD

## 2022-06-06 ASSESSMENT — LOCATION DETAILED DESCRIPTION DERM
LOCATION DETAILED: RIGHT ANTERIOR DISTAL THIGH
LOCATION DETAILED: RIGHT LATERAL SUPERIOR CHEST
LOCATION DETAILED: LEFT PROXIMAL DORSAL FOREARM
LOCATION DETAILED: RIGHT INGUINAL CREASE
LOCATION DETAILED: EPIGASTRIC SKIN
LOCATION DETAILED: RIGHT LATERAL DORSAL FOOT
LOCATION DETAILED: RIGHT INFERIOR CENTRAL MALAR CHEEK
LOCATION DETAILED: LEFT INFERIOR MEDIAL UPPER BACK
LOCATION DETAILED: RIGHT SUPERIOR UPPER BACK
LOCATION DETAILED: RIGHT LATERAL ABDOMEN
LOCATION DETAILED: RIGHT PROXIMAL POSTERIOR UPPER ARM
LOCATION DETAILED: LEFT MEDIAL BREAST 10-11:00 REGION
LOCATION DETAILED: RIGHT PROXIMAL DORSAL FOREARM
LOCATION DETAILED: LEFT LATERAL SUPERIOR CHEST
LOCATION DETAILED: LEFT PROXIMAL POSTERIOR UPPER ARM
LOCATION DETAILED: MIDDLE STERNUM
LOCATION DETAILED: LEFT MEDIAL FOREHEAD
LOCATION DETAILED: LEFT LATERAL ABDOMEN
LOCATION DETAILED: RIGHT MEDIAL UPPER BACK
LOCATION DETAILED: LEFT ANTERIOR DISTAL THIGH
LOCATION DETAILED: LEFT INFERIOR CENTRAL MALAR CHEEK

## 2022-06-06 ASSESSMENT — LOCATION ZONE DERM
LOCATION ZONE: FEET
LOCATION ZONE: FACE
LOCATION ZONE: TRUNK
LOCATION ZONE: ARM
LOCATION ZONE: LEG

## 2022-06-06 NOTE — PROCEDURE: OTHER
Render Risk Assessment In Note?: no
Other (Free Text): Healing appropriate from hysterectomy 2 months ago.
Note Text (......Xxx Chief Complaint.): This diagnosis correlates with the
Detail Level: Detailed

## 2022-10-20 ENCOUNTER — APPOINTMENT (RX ONLY)
Dept: URBAN - METROPOLITAN AREA CLINIC 23 | Facility: CLINIC | Age: 32
Setting detail: DERMATOLOGY
End: 2022-10-20

## 2022-10-20 DIAGNOSIS — L91.8 OTHER HYPERTROPHIC DISORDERS OF THE SKIN: ICD-10-CM

## 2022-10-20 DIAGNOSIS — Z71.89 OTHER SPECIFIED COUNSELING: ICD-10-CM

## 2022-10-20 DIAGNOSIS — L82.0 INFLAMED SEBORRHEIC KERATOSIS: ICD-10-CM

## 2022-10-20 DIAGNOSIS — Z80.8 FAMILY HISTORY OF MALIGNANT NEOPLASM OF OTHER ORGANS OR SYSTEMS: ICD-10-CM

## 2022-10-20 PROBLEM — D48.5 NEOPLASM OF UNCERTAIN BEHAVIOR OF SKIN: Status: ACTIVE | Noted: 2022-10-20

## 2022-10-20 PROCEDURE — 11102 TANGNTL BX SKIN SINGLE LES: CPT

## 2022-10-20 PROCEDURE — 11200 RMVL SKIN TAGS UP TO&INC 15: CPT | Mod: 59

## 2022-10-20 PROCEDURE — ? COUNSELING

## 2022-10-20 PROCEDURE — 99212 OFFICE O/P EST SF 10 MIN: CPT | Mod: 25

## 2022-10-20 PROCEDURE — ? OTHER

## 2022-10-20 PROCEDURE — ? BIOPSY BY SHAVE METHOD

## 2022-10-20 PROCEDURE — ? SKIN TAG REMOVAL

## 2022-10-20 ASSESSMENT — LOCATION DETAILED DESCRIPTION DERM: LOCATION DETAILED: RIGHT ANTERIOR PROXIMAL THIGH

## 2022-10-20 ASSESSMENT — LOCATION ZONE DERM: LOCATION ZONE: LEG

## 2022-10-20 ASSESSMENT — LOCATION SIMPLE DESCRIPTION DERM: LOCATION SIMPLE: RIGHT THIGH

## 2022-10-20 NOTE — PROCEDURE: SKIN TAG REMOVAL
Anesthesia Volume In Cc: 0.5
Medical Necessity Information: It is in your best interest to select a reason for this procedure from the list below. All of these items fulfill various CMS LCD requirements except the new and changing color options.
Anesthesia Type: 1% lidocaine without epinephrine and 0.9% sodium chloride in a 1:1 solution
Include Z78.9 (Other Specified Conditions Influencing Health Status) As An Associated Diagnosis?: No
Add Associated Diagnoses If Applicable When Selecting Medical Necessity: Yes
Detail Level: Detailed
Medical Necessity Clause: This procedure was medically necessary because the lesions that were treated were:
Hemostasis: Aluminum Chloride and Electrocautery
Consent: Verbal consent obtained and the risks of skin tag removal was reviewed with the patient including but not limited to bleeding, pigmentary change, infection, pain, and remote possibility of scarring.

## 2022-10-20 NOTE — PROCEDURE: BIOPSY BY SHAVE METHOD
Hide Anesthesia Volume?: No
Type Of Destruction Used: Curettage
Silver Nitrate Text: The wound bed was treated with silver nitrate after the biopsy was performed.
Billing Type: Third-Party Bill
Biopsy Method: double edge Personna blade
Detail Level: Detailed
Hemostasis: Aluminum Chloride
Electrodesiccation Text: The wound bed was treated with electrodesiccation after the biopsy was performed.
Post-Care Instructions: I reviewed with the patient in detail post-care instructions. Patient is to keep the biopsy site dry overnight, and then apply bacitracin twice daily until healed. Patient may apply hydrogen peroxide soaks to remove any crusting.
Electrodesiccation And Curettage Text: The wound bed was treated with electrodesiccation and curettage after the biopsy was performed.
Wound Care: Petrolatum
X Size Of Lesion In Cm: 0
Anesthesia Volume In Cc: 0.5
Depth Of Biopsy: dermis
Accession #: [id number]
Consent: Written consent was obtained and risks were reviewed including but not limited to scarring, infection, bleeding, scabbing, incomplete removal, nerve damage and allergy to anesthesia.
Cryotherapy Text: The wound bed was treated with cryotherapy after the biopsy was performed.
Was A Bandage Applied: Yes
Dressing: bandage
Anesthesia Type: 1% lidocaine without epinephrine and a 1:10 solution of 8.4% sodium bicarbonate
Size Of Lesion In Cm: 1.6
Notification Instructions: Patient will be notified of biopsy results. However, patient instructed to call the office if not contacted within 2 weeks.
Information: Selecting Yes will display possible errors in your note based on the variables you have selected. This validation is only offered as a suggestion for you. PLEASE NOTE THAT THE VALIDATION TEXT WILL BE REMOVED WHEN YOU FINALIZE YOUR NOTE. IF YOU WANT TO FAX A PRELIMINARY NOTE YOU WILL NEED TO TOGGLE THIS TO 'NO' IF YOU DO NOT WANT IT IN YOUR FAXED NOTE.
Biopsy Type: H and E
Curettage Text: The wound bed was treated with curettage after the biopsy was performed.

## 2022-10-20 NOTE — PROCEDURE: OTHER
Other (Free Text): Pathology declined
Detail Level: Detailed
Render Risk Assessment In Note?: no
Note Text (......Xxx Chief Complaint.): This diagnosis correlates with the

## 2022-12-06 ENCOUNTER — OFFICE VISIT (OUTPATIENT)
Dept: UROGYNECOLOGY | Age: 32
End: 2022-12-06
Payer: COMMERCIAL

## 2022-12-06 VITALS — WEIGHT: 206 LBS | BODY MASS INDEX: 35.17 KG/M2 | HEIGHT: 64 IN

## 2022-12-06 DIAGNOSIS — N81.89 WEAKNESS OF PELVIC FLOOR: Primary | ICD-10-CM

## 2022-12-06 PROCEDURE — 51701 INSERT BLADDER CATHETER: CPT | Performed by: OBSTETRICS & GYNECOLOGY

## 2022-12-06 PROCEDURE — 99205 OFFICE O/P NEW HI 60 MIN: CPT | Performed by: OBSTETRICS & GYNECOLOGY

## 2022-12-06 RX ORDER — ALBUTEROL SULFATE 90 UG/1
2 AEROSOL, METERED RESPIRATORY (INHALATION) EVERY 4 HOURS PRN
COMMUNITY
Start: 2020-10-28

## 2022-12-06 ASSESSMENT — PATIENT HEALTH QUESTIONNAIRE - PHQ9
SUM OF ALL RESPONSES TO PHQ QUESTIONS 1-9: 0
SUM OF ALL RESPONSES TO PHQ QUESTIONS 1-9: 0
SUM OF ALL RESPONSES TO PHQ9 QUESTIONS 1 & 2: 0
SUM OF ALL RESPONSES TO PHQ QUESTIONS 1-9: 0
1. LITTLE INTEREST OR PLEASURE IN DOING THINGS: 0
2. FEELING DOWN, DEPRESSED OR HOPELESS: 0
SUM OF ALL RESPONSES TO PHQ QUESTIONS 1-9: 0

## 2022-12-06 NOTE — PROGRESS NOTES
[facility]  [address]  Dept: [phone]        PCP:  Nathan Pritchard MD    12/6/2022      HPI:  I am being asked to see this patient in consultation by Dr. Kassandra epperson. provider found   for New Patient (IC)    She voids 4-5 times during the day. She voids 1-2 times over night. She has 3-4 BM per week, and does not strain. She drinks 1-2 caffeine drinks beverages per day. She uses 0 artificial sweeteners per day. She drinks 0 alcoholic beverages per week. She has pelvic surgery in the past.   Her last PAP: patient states 05/2022. No components found for: [id number]  Her last Colonoscopy:   Her last Mammogram: 04/2020  She does not have a history of DM. No results found for: LABA1C  No results found for: EAG    She does not have a history of sleep apnea. No  Tobacco: no    Sexual History: single partner, contraception - status post hysterectomy. Notes were reviewed from the referring provider Dr. Silas Browne PCP      No results found for this visit on 12/06/22. Ht 5' 4\" (1.626 m)   Wt 206 lb (93.4 kg)   BMI 35.36 kg/m²     PVR by straight catheterization: 10cc    Physical Exam  Vitals reviewed. Exam conducted with a chaperone present. Constitutional:       General: She is not in acute distress. Appearance: Normal appearance. She is normal weight. HENT:      Head: Normocephalic and atraumatic. Pulmonary:      Effort: Pulmonary effort is normal. No respiratory distress. Abdominal:      General: There is no distension. Palpations: There is no mass. Tenderness: There is no abdominal tenderness. There is no guarding or rebound. Hernia: No hernia is present. Musculoskeletal:      Cervical back: Normal range of motion. Skin:     General: Skin is warm and dry. Neurological:      Mental Status: She is alert and oriented to person, place, and time.    Psychiatric:         Mood and Affect: Mood normal.         Behavior: Behavior normal.         Thought Content: Thought content normal.         Judgment: Judgment normal.        Female Genitourinary   Vulva:    Normal. No lesions  Bartholin's Gland:  Bilateral , Normal, nontender  Skenes Gland:  Bilateral, Normal, nontender   Clitoris:  Normal.   Introitus:    Normal.   Urethral Meatus:  Normal appearing, normal size, no lesions, no prolapse  Urethra:  No masses, no tenderness  Vagina:  No atrophy, no discharge, no lesions  Cervix:  absent  Uterus:  absent  Adnexa:   No masses palpated, no tenderness  Bladder:  No tenderness, no masses palpated  Perineum:  Normal, no lesions    Rectal   Anorectal Exam: No hemorrhoids and no masses or lesions of the perineum    POP-Q: (Pelvic Organ Prolapse - Quantification Exam):  No flowsheet data found. Pelvic floor muscles: Tender Spasm     R. Puborectalis: NO 0 /5    L. Puborectalis: NO 0 /5    R. Pubococcyg NO 0 /5    L. Pubococcyg NO 0 /5    R. Ileococcyg: NO 0 /5    L. Ileococcyg: NO 0 /5    R. Obturator Int: NO 0 /5    L. Obturator Int: NO 0 /5    R. Coccygeus: NO 0 /5    L. Coccygeus: NO 0 /5      Pelvic floor contractions: 0/5    Supine Stress Test of FLORA: neg    Neurological Exam:   Sensorineural Exam:    Bulvocavernosus reflex:  normal   Anal Kingsley:  normal      1. Weakness of pelvic floor  Assessment & Plan:  We discussed the purpose of physical therapy which is to strengthen the pelvic floor muscles and teach proper coordination of those muscles. I described the anatomy of those muscles involved and their relationship to the end-organs in the pelvis. I described therapy techniques which include a combination of therapeutic exercise, biofeedback, neuromuscular re-education, home programs, and electrical stimulation, as well as therapeutic massage and ultrasound for pain. Orders:  -     Ambulatory referral to Physical Therapy  -     OH INSERT,NON-INDWELLING BLADDER CATHETER  -     Culture, Urine;  Future     No follow-ups on file.      On this date 12/6/2022 I have spent 60 minutes reviewing previous notes, test results and face to face with the patient discussing the diagnosis and importance of compliance with the treatment plan as well as documenting on the day of the visit.     Levi Christina, DO

## 2022-12-07 DIAGNOSIS — N81.89 WEAKNESS OF PELVIC FLOOR: ICD-10-CM

## 2022-12-10 LAB
BACTERIA SPEC CULT: NORMAL
SERVICE CMNT-IMP: NORMAL

## 2022-12-12 ENCOUNTER — TELEPHONE (OUTPATIENT)
Dept: UROGYNECOLOGY | Age: 32
End: 2022-12-12

## 2022-12-12 NOTE — TELEPHONE ENCOUNTER
Spoke to patient and relayed results. Patient voiced understanding to all and had no further questions at this time.

## 2022-12-12 NOTE — RESULT ENCOUNTER NOTE
Please call [facility] and let them know their urine culture came back negative for infection. If she is taking any antibiotics, she may stop taking them at this time.

## 2022-12-12 NOTE — TELEPHONE ENCOUNTER
----- Message from Cathi Acosta DO sent at 12/12/2022  8:48 AM EST -----      ----- Message -----  From: Viraj Turcios Incoming Julian W/Jonathan Micro  Sent: 12/10/2022   9:12 AM EST  To: Cathi Acosta DO

## 2023-01-04 ENCOUNTER — HOSPITAL ENCOUNTER (OUTPATIENT)
Dept: PHYSICAL THERAPY | Age: 33
Setting detail: RECURRING SERIES
End: 2023-01-04
Payer: COMMERCIAL

## 2023-01-04 NOTE — PROGRESS NOTES
Moni Eng  : 1990  Primary: [employer] [facility] [employer]  Secondary:  [facility] @ [address] VILLAFANA Brian  [facility] [address]  Phone: [phone]  Fax: [phone]    PT Visit Info:    No data recorded   OT Visit Info:  No data recorded    OUTPATIENT THERAPY:OP NOTE TYPE: Progress Report 2023               Episode  Appt Desk           Moni Eng cancelled her  appointment  for today due to weather concerns.  Will plan to follow up next during next appointment.  Thank you,  Tania Medina PT    Future Appointments   Date Time Provider Department Center   2023  8:45 AM Tania Medina, PT [facility]

## 2023-01-23 ENCOUNTER — HOSPITAL ENCOUNTER (OUTPATIENT)
Dept: PHYSICAL THERAPY | Age: 33
Setting detail: RECURRING SERIES
Discharge: HOME | End: 2023-01-26
Payer: COMMERCIAL

## 2023-01-23 PROCEDURE — 97161 PT EVAL LOW COMPLEX 20 MIN: CPT

## 2023-01-23 PROCEDURE — 97530 THERAPEUTIC ACTIVITIES: CPT

## 2023-01-23 NOTE — PROGRESS NOTES
Rafita Silver  : 1990  Primary: [facility] ([address])  Secondary:  [address] @ [facility] [address] [place] [address] Way [address]  Phone: [phone]  Fax: [phone] Plan Frequency: 1x/week  No data recorded    PT Visit Info:  Plan Frequency: 1x/week    Visit Count:  1    OUTPATIENT PHYSICAL THERAPY:OP NOTE TYPE: Treatment Note 2023       Episode  }Appt Desk               Treatment Diagnosis:  Lack of coordination (muscle incoordination) (R27.8)  Pelvic floor dysfunction in female (M62.98)  Dyspareunia (N94.1)  Muscle spasm (M62.83)  Weakness of pelvic floor N81.89  Medical/Referring Diagnosis:  Weakness of pelvic floor [N81.89]  Referring Physician:  Michelle Rivera DO MD Orders:  PT Eval and Treat   Date of Onset:  No data recorded   Allergies:   Hydromorphone  Restrictions/Precautions:  No data recorded  No data recorded   Interventions Planned (Treatment may consist of any combination of the following):    Current Treatment Recommendations: ROM; Strengthening; Neuromuscular re-education; Manual; Pain management; Home exercise program; Positioning; Therapeutic activities     Subjective Comments: Dysuria     Initial:} 0    /10Post Session:   0     /10  Medications Last Reviewed:  2023  Updated Objective Findings:  See evaluation note from today  Treatment   THERAPEUTIC ACTIVITY: ( 25 minutes): Functional activity education regarding anatomy, pathology and role of pelvic floor muscle (PFM) function in relation to presenting symptoms and role of pelvic floor therapy in conservative treatment., Functional activity education on avoiding bladder irritants, substitutions for common irritants, recommended fluid intake (types and spacing), appropriate voiding frequency, weight management and overall contributing factors of bowel health on bladder health/function in order to improve independent self management.  Patient was provided a list of common bladder irritants including caffeine, carbonation, alcohol, artificial sweeteners, chocolate, acidic drinks, and tomato based drinks. , and Instruction on coordinated pelvic floor and diaphragmatic breathing to improve kinesthetic awareness of pelvic muscle mobility and restore proper motor recruitment patterns with breathing, posture, and functional movement (e.g. appropriate lift/contraction with increased IAP such as a cough, laugh, sneeze to prevent incontinence as well as appropriate relaxation/drop to reduce pain with vaginal penetration). TA Educational Topic Notes Date Completed   Pathology/Anatomy/PFM Function Completed 1/23/23   Bladder health education Completed 1/23/23   Urinary urge suppression     The knack     Voiding strategies Completed - PFM drops/relaxation, toilet positioning 1/23/23   Nocturia tips     Bowel/Bladder log     Bowel health education     Constipation care     Diarrhea/Fecal leakage     Colonic massage     Toilet positioning     Defecation dynamics     Sources of fiber     Return to intercourse/Dilator training     Sexual positioning     Lubricants/vaginal moisturizers     Vulvovaginal health/vaginal irritants     Body mechanics     Posture/ergonomics     Diaphragmatic breathing Prescribed as part of HEP, provided handout 1/23/23   Resources and technology     Other patient education          Treatment/Session Summary:    Treatment Assessment: Pt verbalized understanding of POC. All questions answered at this time. Communication/Consultation:  None today  Equipment provided today:  None  Recommendations/Intent for next treatment session: Next visit will focus on sEMG biofeedback for PFM proprioceptive awareness and down-training, MT to PFM, review diaphragmatic breathing and toilet positioning.     Total Treatment Billable Duration:  55 minutes (25 minutes of treatment, 30 minutes evaluation)  Time In: 0305  Time Out: 0400    Rico Marcos PT       Charge Capture  }Post Session Pain  PT Visit [facility] Portal  MD Guidelines  Scanned Media  Benefits  MyChart    Future Appointments   Date Time Provider Laura Crystal   2/8/2023  8:00 AM Baljit johnson PT [facility]   2/15/2023  8:00 AM Baljit johnson PT [facility]   2/22/2023  8:00 AM Baljit johnson PT [facility]   3/1/2023  8:00 AM Baljit johnson PT [facility] ARIANNA

## 2023-01-23 NOTE — THERAPY EVALUATION
Medardo Couch  : 1990  Primary: [facility] ([address])  Secondary:  [address] @ [address] [place] [address] Way [address]  Phone: [phone]  Fax: [phone] Plan Frequency: 1x/week       PT Visit Info:  Plan Frequency: 1x/week    Visit Count:  1                OUTPATIENT PHYSICAL THERAPY:             OP NOTE TYPE: Initial Assessment 2023               Episode (PFPT) Appt Desk         Treatment Diagnosis:  Lack of coordination (muscle incoordination) (R27.8)  Pelvic floor dysfunction in female (M62.98)  Dyspareunia (N94.1)  Muscle spasm (M62.83)  Weakness of pelvic floor N81.89  Medical/Referring Diagnosis:  Weakness of pelvic floor [N81.89]  Referring Physician:  Juan Mckeon DO MD Orders:  PT Eval and Treat   Return MD Appt:    Date of Onset:       Allergies:  Hydromorphone  Restrictions/Precautions:           Medications Last Reviewed:  2023     SUBJECTIVE   History of Injury/Illness (Reason for Referral):  Ms. Jaime Spann is a 35 yo female referred to pelvic floor physical therapy (PFPT) by Juan Mckeon DO 2/2 Weakness of pelvic floor [N81.89] Pt reports symptoms of sharp lower abdominal discomfort (described as 7/10) during urinary evacuation. This began following a LAVH 23 and she feels this has gradually worsened. She describes the sensation as \"feeling her stomach dropping as if she is on a roller coaster\". Symptoms reduce after voiding. States her urinary urge is the same as pre-surgery. She denies hesitancy with urinary evacuation. Laparoscopically assisted vaginal hysterectomy (LAVH) 22    Pt has a history of PCOS. She had several abnormal pap smears with cancerous cells, thus opted for hysterectomy. Pt denies pelvic heaviness and/or pressure lifting. Pt does not think she has difficulty yuliana her pelvic floor muscles. She states she has no problem holding back gas. Urinary: Frequency 5-8 x/day  Positive for dysuria. Negative for stress urinary incontinence, urge urinary incontinence, urinary urgency, urinary frequency, incomplete emptying, urinary hesitancy/intermittency, hematuria, and nocturia. Pt does not use pads for urinary protection; 0 pads per day (PPD). Fluid intake: 9 30  oz water tumblers/day; bladder irritants include: [place] (1/day). Pt does not limit fluid intake due to bladder control. Bowel: Frequency Daily. Negative for pushing/straining with bowel movement. Sexual: Pt is  sexually active with male partners. positive for dyspareunia. Pain is deep. This is intermittent. Pain feels different than her pain during voiding. History of sexual abuse: No    OB History: Number of pregnancies: 1 Number of vaginal deliveries: 0 Number of c-sections: 0. Miscarriage at 6 weeks. *Sciatica on her right. This is well managed currently. She sees a chiropractor 1x/week and this has helped. Patient Stated Goal(s): \"To reduce discomfort during urinary evacuation\"  Initial:      /10 Post Session:      /10  Past Medical History/Comorbidities:   Ms. Demarco Garza  has a past medical history of Abnormal Papanicolaou smear of cervix, Abnormal vaginal Pap smear, Anxiety disorder, GERD (gastroesophageal reflux disease), HPV in female, Polycystic ovarian syndrome, Trichomonas infection, and Vitamin D deficiency. Ms. Demarco Garza  has a past surgical history that includes tlh and bso (cervix removed) (04/14/2022) and LEEP. Social History/Living Environment:   Lives With: Alone     Prior Level of Function/Work/Activity:   Prior level of function: Independent  Occupation: Full time employment  Type of Occupation: Works night shift at [address] [place] [address]:   Does the patient/guardian have any barriers to learning?: No barriers     Fall Risk Scale:    Rivas Total Score: 0  Rivas Fall Risk: Low (0-24)     OBJECTIVE   External Observations:  Voluntary contraction: [] absent     [x] present  Voluntary relaxation: [x] absent     [] present  Involuntary contraction: [] absent     [x] present  Involuntary relaxation: [] absent     [x] present  Perineal descent at rest: [x] absent     [] present  Perineal descent with bearing: [x] absent     [] present      Pelvic Floor Muscle (PFM) Assessment:  Vaginal vault size: [] decreased     [] increased     [x] WNL  Muscle volume: [] decreased     [] WNL     [x] Defect  PFM tension: [] decreased     [x] increased     [] WNL    Contraction ability:  Voluntary contraction: [] absent     [x] weak     [] moderate      [] strong  Voluntary relaxation: [] absent     [] partial   [] complete   [x] delayed    [x] incomplete    MMT: 2/5   PFM endurance: 10 seconds   Number of quick contractions in 10 seconds: 3  Quality of contractions: Good      Palpation: via vaginal canal - Overactivity present  Superficial Pelvic Floor Musculature (PFM): Tender? Intermediate PFM Tender? Deep PFM Tender? Superficial Transverse Perineal [] Right  [] Left  []DNT Deep Transverse Perineal [x] Right  [x] Left  []DNT Puborectalis [] Right  [] Left  []DNT   Ischiocavernosus [] Right  [] Left  []DNT Compressor Urethra [] Right  [] Left  []DNT Pubococcygeus [x] Right  [x] Left  []DNT   Bulbocavernosus [] Right  [] Left  []DNT   Iliococcygeus [x] Right  [x] Left  []DNT       Obturator Internus [] Right  [] Left  []DNT       Coccygeus [] Right  [] Left  []DNT        External palpation:  Muscle/muscle group Tender?    Adductors [] Right  [] Left  []DNT   Gluteals [] Right  [] Left  []DNT   Piriformis [] Right  [] Left  []DNT   Iliopsoas [] Right  [] Left  []DNT   Abdominal wall [] Right  [] Left  []DNT   Pubic symphysis [] Right  [] Left  []DNT     Breath assessment:  Observation: chest breathing dominant  Coordination of pelvic floor muscles with breath: no pelvic floor muscle excursion  Co-contraction of PFM with transversus abdominis: absent        ASSESSMENT   Initial Assessment:  Patrick Hansen presents with musculoskeletal limitations including pelvic floor muscle (PFM) tension, reduced PFM Range of Motion (ROM), increased tenderness to palpation of the PFM, altered body mechanics, reduced coordination and awareness of PFM, abdominal soft tissue restrictions, poor diaphragm excursion, and decreased pelvic floor muscle (PFM) strength. These limitations are impairing the patient's ability to properly coordinate perineal elevation and descent for normalized PFM function, contributing to urinary dysfunction. As a result, she is limited in her/his ability to participate in pain-free urinary evacuation and pain free sexual intercourse. Problem List: (Impacting functional limitations): Body Structures, Functions, Activity Limitations Requiring Skilled Therapeutic Intervention: Decreased ROM; Decreased body mechanics; Increased pain; Decreased coordination     Therapy Prognosis:   Therapy Prognosis: Excellent     Initial Assessment Complexity:   Decision Making: Low Complexity    PLAN   Effective Dates: 1/23/23 TO   04/23/23   Frequency/Duration: Plan Frequency: 1x/week   Interventions Planned (Treatment may consist of any combination of the following):    Current Treatment Recommendations: ROM; Strengthening; Neuromuscular re-education; Manual; Pain management; Home exercise program; Positioning; Therapeutic activities     Goals: (Goals have been discussed and agreed upon with patient.)  Short-Term Functional Goals: Time Frame: 3-4 weeks  Pt will demonstrate I with basic PFM HEP to improve awareness, coordination, and timing of PFM. Patient will demonstrate ability to perform diaphragmatic breathing in multiple positions to assist in pelvic floor tension reduction. Patient will verbalize understanding and demonstrate postural adjustments which facilitate lengthening and reduce overall pelvic floor muscle activity.    Discharge Goals: Time Frame: 6-8 weeks  Patient will demonstrate independence with a home exercise program for aerobic conditioning, core stabilization, and general mobility for independent management of symptoms. Patient will demonstrate normal voluntary relaxation of the pelvic floor muscle group to improve pelvic floor ROM and tolerate pain free vaginal penetration. Patient will report no pain or discomfort with daily voiding. Outcome Measure:   Pelvic Floor Outcome Measure:  Pelvic Floor Impact Questionnaire--short form 7 (PFIQ-7):  Score:  Initial: 1/23/23  Bladder or Urine: 0/100  Bowel or Rectum: 0/100  Vagina or Pelvis: 0/100 Most Recent: X (Date: -- )  Bladder or Urine: X/100  Bowel or Rectum: X/100  Vagina or Pelvis: X/100   Interpretation of Score: Each of the 7 sections is scored on a scale from 0-3; 0 representing \"Not at all\", 3 representing \"Quite a bit\". The mean value is taken from all the answered items, then multiplied by 100 to obtain the scale score, ranging from 0-100. This process is repeated for each column representing bowel, bladder, and pelvic pain. Medical Necessity:   > Skilled intervention continues to be required due to the above mentioned deficits. Reason For Services/Other Comments:  > Patient continues to require skilled intervention due to the above mentioned deficits. Total Duration:  Time In: 0305  Time Out: 0400    Regarding Moni Eng's therapy, I certify that the treatment plan above will be carried out by a therapist or under their direction.   Thank you for this referral,  Monica Peña, PT     Referring Physician Signature: Unk Race, DO _______________________________ Date _____________        Post Session Pain  Charge Capture  PT Visit Info MD Guidelines  MyChart

## 2023-02-08 ENCOUNTER — HOSPITAL ENCOUNTER (OUTPATIENT)
Dept: PHYSICAL THERAPY | Age: 33
Setting detail: RECURRING SERIES
Discharge: HOME | End: 2023-02-11
Payer: COMMERCIAL

## 2023-02-08 PROCEDURE — 97140 MANUAL THERAPY 1/> REGIONS: CPT

## 2023-02-08 PROCEDURE — 97110 THERAPEUTIC EXERCISES: CPT

## 2023-02-08 NOTE — PROGRESS NOTES
Callie Riedel  : 1990  Primary: [facility] ([address])  Secondary:  [address] @ [facility] [address] [place] [address] Way [address]  Phone: [phone]  Fax: [phone] Plan Frequency: 1x/week  No data recorded    PT Visit Info:  Plan Frequency: 1x/week      Visit Count:  2    OUTPATIENT PHYSICAL THERAPY:OP NOTE TYPE: Treatment Note 2023       Episode  }Appt Desk               Treatment Diagnosis:  Lack of coordination (muscle incoordination) (R27.8)  Pelvic floor dysfunction in female (M62.98)  Dyspareunia (N94.1)  Muscle spasm (M62.83)  Weakness of pelvic floor N81.89  Medical/Referring Diagnosis:  Weakness of pelvic floor [N81.89]  Referring Physician:  Julia Chu DO MD Orders:  PT Eval and Treat   Date of Onset:  No data recorded   Allergies:   Hydromorphone  Restrictions/Precautions:  No data recorded  No data recorded   Interventions Planned (Treatment may consist of any combination of the following):    Current Treatment Recommendations: ROM; Strengthening; Neuromuscular re-education; Manual; Pain management; Home exercise program; Positioning; Therapeutic activities     Subjective Comments: Dysuria    Pt describes when she is breathing during urination she does not have a problem and discomfort is less. When she does not practice her breathing she describes 5/10 discomfort.       Initial:} 0    /10Post Session:   0     /10  Medications Last Reviewed:  2023  Updated Objective Findings  Mild tenderness present with palpation of lower abdomen and B proximal adductors   Treatment   THERAPEUTIC ACTIVITY: (4 minutes): Functional activity education regarding anatomy, pathology and role of pelvic floor muscle (PFM) function in relation to presenting symptoms and role of pelvic floor therapy in conservative treatment., Functional activity education on avoiding bladder irritants, substitutions for common irritants, recommended fluid intake (types and spacing), appropriate voiding frequency, weight management and overall contributing factors of bowel health on bladder health/function in order to improve independent self management. Patient was provided a list of common bladder irritants including caffeine, carbonation, alcohol, artificial sweeteners, chocolate, acidic drinks, and tomato based drinks. , and Instruction on coordinated pelvic floor and diaphragmatic breathing to improve kinesthetic awareness of pelvic muscle mobility and restore proper motor recruitment patterns with breathing, posture, and functional movement (e.g. appropriate lift/contraction with increased IAP such as a cough, laugh, sneeze to prevent incontinence as well as appropriate relaxation/drop to reduce pain with vaginal penetration). TA Educational Topic Notes Date Completed   Pathology/Anatomy/PFM Function Completed 1/23/23   Bladder health education Completed 1/23/23   Urinary urge suppression     The mason     Voiding strategies Completed - PFM drops/relaxation, toilet positioning 1/23/23   Nocturia tips     Bowel/Bladder log     Bowel health education     Constipation care     Diarrhea/Fecal leakage     Colonic massage     Toilet positioning     Defecation dynamics     Sources of fiber     Return to intercourse/Dilator training     Sexual positioning     Lubricants/vaginal moisturizers     Vulvovaginal health/vaginal irritants     Body mechanics     Posture/ergonomics     Diaphragmatic breathing Prescribed as part of HEP, provided handout  Reviewed 1/23/23 2/8/23   Resources and technology     Other patient education       THERAPEUTIC EXERCISE: (10 minutes):    Exercises per grid below to improve mobility. Required minimal verbal cues to promote proper body mechanics and promote proper body breathing techniques. Progressed range and repetitions as indicated.    Date:  2/8/23 Date:   Date:     Activity/Exercise Parameters Parameters Parameters   Supine adductor stretch,  Elbows - mantis 3 x 30 sec     Lisette pose  3 x 30 sec     Piriformis stretch 3 x 30 sec        MANUAL THERAPY: (40 minutes): Soft tissue mobilization was utilized and necessary because of the patient's restricted motion of soft tissue. Date Type Location Comments   2/8/2023 Internal assessment/treatment Via vaginal canal STM through urogenital triangle, moriah-urethral tissues, LA muscles - SP applied     STM B adductors, lower abdomen, bladder mobilizations                                 (Used abbreviations: MET - muscle energy technique; SCS- Strain counter strain; CTM-Connective tissue mobilizations; CR- Contract/Relax; SP- Sustained pressure; PIT- Positional inhibition techniques; STM Soft -tissue mobilization; MM- Myofascial mobilization; TrP-Trigger point release; IASTM- Instrument assisted soft tissue mobilizations, TDN-Trigger point dry needling)    Pt gives verbal consent to internal vaginal assessment/treatment without chaperon present. Treatment/Session Summary:    Treatment Assessment: Pt with improved PF ROM and ability to coordinate PF drop well compared to her previous session. Mild tenderness present through proximal adductors (B) and right lower abdomen. Communication/Consultation:  None today  Equipment provided today:  None  Recommendations/Intent for next treatment session: Next visit will focus on sEMG biofeedback for PFM proprioceptive awareness and down-training, MT to PFM, review diaphragmatic breathing and toilet positioning.     Total Treatment Billable Duration:  Treatment minutes 54  Time In: 2819  Time Out: Jonh 137, PT       Charge Capture  }Post Session Pain  PT Visit [facility] Portal  MD Guidelines  Scanned Media  Benefits  MyChart    Future Appointments   Date Time Provider Laura Crystal   2/15/2023  8:00 AM Baljit johnson PT [facility]   2/22/2023  8:00 AM Baljit johnson PT [facility]   3/1/2023  8:00 AM Baljit johnson PT [facility] ARIANNA

## 2023-02-15 ENCOUNTER — HOSPITAL ENCOUNTER (OUTPATIENT)
Dept: PHYSICAL THERAPY | Age: 33
Setting detail: RECURRING SERIES
Discharge: HOME | End: 2023-02-18
Payer: COMMERCIAL

## 2023-02-15 PROCEDURE — 97110 THERAPEUTIC EXERCISES: CPT

## 2023-02-15 PROCEDURE — 97140 MANUAL THERAPY 1/> REGIONS: CPT

## 2023-02-15 NOTE — PROGRESS NOTES
Ailyn Laboy  : 1990  Primary: [facility] ([address])  Secondary:  [address] @ [address] [address]  Phone: [phone]  Fax: [phone] Plan Frequency: 1x/week  No data recorded    PT Visit Info:  Plan Frequency: 1x/week      Visit Count:  3    OUTPATIENT PHYSICAL THERAPY:OP NOTE TYPE: Treatment Note 2/15/2023       Episode  }Appt Desk               Treatment Diagnosis:  Lack of coordination (muscle incoordination) (R27.8)  Pelvic floor dysfunction in female (M62.98)  Dyspareunia (N94.1)  Muscle spasm (M62.83)  Weakness of pelvic floor N81.89  Medical/Referring Diagnosis:  Weakness of pelvic floor [N81.89]  Referring Physician:  Kassandra Guerra DO MD Orders:  PT Eval and Treat   Date of Onset:  No data recorded   Allergies:   Hydromorphone  Restrictions/Precautions:  No data recorded  No data recorded   Interventions Planned (Treatment may consist of any combination of the following):    Current Treatment Recommendations: ROM; Strengthening; Neuromuscular re-education; Manual; Pain management; Home exercise program; Positioning; Therapeutic activities     Subjective Comments: Dysuria  Pt reports reduced discomfort following urination. Pt describes aching discomfort following previous session of PFPT. This has lessened. Initial:} 0    /10Post Session:   0     /10  Medications Last Reviewed:  2/15/2023  Updated Objective Findings  Mild tenderness present through left adductor group and LA muscles. Reduced tenderness present through lower abdomen.    Treatment   THERAPEUTIC ACTIVITY: (minutes): Functional activity education regarding anatomy, pathology and role of pelvic floor muscle (PFM) function in relation to presenting symptoms and role of pelvic floor therapy in conservative treatment., Functional activity education on avoiding bladder irritants, substitutions for common irritants, recommended fluid intake (types and spacing), appropriate voiding frequency, weight management and overall contributing factors of bowel health on bladder health/function in order to improve independent self management. Patient was provided a list of common bladder irritants including caffeine, carbonation, alcohol, artificial sweeteners, chocolate, acidic drinks, and tomato based drinks. , and Instruction on coordinated pelvic floor and diaphragmatic breathing to improve kinesthetic awareness of pelvic muscle mobility and restore proper motor recruitment patterns with breathing, posture, and functional movement (e.g. appropriate lift/contraction with increased IAP such as a cough, laugh, sneeze to prevent incontinence as well as appropriate relaxation/drop to reduce pain with vaginal penetration). TA Educational Topic Notes Date Completed   Pathology/Anatomy/PFM Function Completed 1/23/23   Bladder health education Completed 1/23/23   Urinary urge suppression     The mason     Voiding strategies Completed - PFM drops/relaxation, toilet positioning 1/23/23   Nocturia tips     Bowel/Bladder log     Bowel health education     Constipation care     Diarrhea/Fecal leakage     Colonic massage     Toilet positioning     Defecation dynamics     Sources of fiber     Return to intercourse/Dilator training     Sexual positioning     Lubricants/vaginal moisturizers     Vulvovaginal health/vaginal irritants     Body mechanics     Posture/ergonomics     Diaphragmatic breathing Prescribed as part of HEP, provided handout  Reviewed 1/23/23 2/8/23   Resources and technology     Other patient education       THERAPEUTIC EXERCISE: (23 minutes):    Exercises per grid below to improve mobility. Required minimal verbal cues to promote proper body mechanics and promote proper body breathing techniques. Progressed range and repetitions as indicated.    Date:  2/8/23 Date:  2/15/23 Date:     Activity/Exercise Parameters Parameters Parameters   Supine adductor stretch,  Elbows - mantis 3 x 30 sec     Lisette pose  3 x 30 sec     Piriformis stretch 3 x 30 sec 3 x 30 sec    Cat/cow  X 10    Quadruped rocking   With plyoball x 30 sec    Supine PF stretch  3 x  30 sec    Supine DKTC  3 x  30 sec       MANUAL THERAPY: (30 minutes): Soft tissue mobilization was utilized and necessary because of the patient's restricted motion of soft tissue. Date Type Location Comments   2/15/2023 Internal assessment/treatment Via vaginal canal STM through urogenital triangle, moriah-urethral tissues, LA muscles - SP applied     STM B adductors, lower abdomen, bladder mobilizations                                 (Used abbreviations: MET - muscle energy technique; SCS- Strain counter strain; CTM-Connective tissue mobilizations; CR- Contract/Relax; SP- Sustained pressure; PIT- Positional inhibition techniques; STM Soft -tissue mobilization; MM- Myofascial mobilization; TrP-Trigger point release; IASTM- Instrument assisted soft tissue mobilizations, TDN-Trigger point dry needling)    Pt gives verbal consent to internal vaginal assessment/treatment without chaperon present. Treatment/Session Summary:    Treatment Assessment: Pt with reduced tenderness to palpation of her lower abdomen, adductors, and through moriah-urethral tissue. Pt is progressing well, reporting reduction in dysuria.    Communication/Consultation:  None today  Equipment provided today:  None  Recommendations/Intent for next treatment session: Next visit will focus on sEMG biofeedback for PFM proprioceptive awareness and down-training, MT to PFM, review diaphragmatic breathing and toilet positioning, continue intra-vaginal MT    Total Treatment Billable Duration:  Treatment minutes 53  Time In: 0800  Time Out: 0856    Ora Marquis PT       Charge Capture  }Post Session Pain  PT Visit [address] [facility] Portal  MD [address]    Future Appointments   Date Time Provider Laura Crystal   2/22/2023 8:00 AM Baljit johnson PT [facility]   3/1/2023  8:00 AM JANET WardE

## 2023-02-22 ENCOUNTER — HOSPITAL ENCOUNTER (OUTPATIENT)
Dept: PHYSICAL THERAPY | Age: 33
Setting detail: RECURRING SERIES
Discharge: HOME | End: 2023-02-25
Payer: COMMERCIAL

## 2023-02-22 PROCEDURE — 97140 MANUAL THERAPY 1/> REGIONS: CPT

## 2023-02-22 PROCEDURE — 97110 THERAPEUTIC EXERCISES: CPT

## 2023-02-22 NOTE — PROGRESS NOTES
Rise Flight  : 1990  Primary: [facility] ([address])  Secondary:  [address] @ [facility] [address] [address]  Phone: [phone]  Fax: [phone] Plan Frequency: 1x/week  No data recorded    PT Visit Info:  Plan Frequency: 1x/week      Visit Count:  4    OUTPATIENT PHYSICAL THERAPY:OP NOTE TYPE: Treatment Note 2023       Episode  }Appt Desk               Treatment Diagnosis:  Lack of coordination (muscle incoordination) (R27.8)  Pelvic floor dysfunction in female (M62.98)  Dyspareunia (N94.1)  Muscle spasm (M62.83)  Weakness of pelvic floor N81.89  Medical/Referring Diagnosis:  Weakness of pelvic floor [N81.89]  Referring Physician:  Marissa Olmos DO MD Orders:  PT Eval and Treat   Date of Onset:  No data recorded   Allergies:   Hydromorphone  Restrictions/Precautions:  No data recorded  No data recorded   Interventions Planned (Treatment may consist of any combination of the following):    Current Treatment Recommendations: ROM; Strengthening; Neuromuscular re-education; Manual; Pain management; Home exercise program; Positioning; Therapeutic activities     Subjective Comments: Dysuria  Pt denies pelvic pain. Pt reports no discomfort with urination. Soreness following previous session of PFPT which resolved quickly. Initial:} 0    /10Post Session:   0     /10  Medications Last Reviewed:  2023  Updated Objective Findings  Mild to moderate discomfort reported with palpation of PFM. Reduced discomfort through abdominal wall.     Treatment   THERAPEUTIC ACTIVITY: (minutes): Functional activity education regarding anatomy, pathology and role of pelvic floor muscle (PFM) function in relation to presenting symptoms and role of pelvic floor therapy in conservative treatment., Functional activity education on avoiding bladder irritants, substitutions for common irritants, recommended fluid intake (types and spacing), appropriate voiding frequency, weight management and overall contributing factors of bowel health on bladder health/function in order to improve independent self management. Patient was provided a list of common bladder irritants including caffeine, carbonation, alcohol, artificial sweeteners, chocolate, acidic drinks, and tomato based drinks. , and Instruction on coordinated pelvic floor and diaphragmatic breathing to improve kinesthetic awareness of pelvic muscle mobility and restore proper motor recruitment patterns with breathing, posture, and functional movement (e.g. appropriate lift/contraction with increased IAP such as a cough, laugh, sneeze to prevent incontinence as well as appropriate relaxation/drop to reduce pain with vaginal penetration). TA Educational Topic Notes Date Completed   Pathology/Anatomy/PFM Function Completed 1/23/23   Bladder health education Completed 1/23/23   Urinary urge suppression     The mason     Voiding strategies Completed - PFM drops/relaxation, toilet positioning 1/23/23   Nocturia tips     Bowel/Bladder log     Bowel health education     Constipation care     Diarrhea/Fecal leakage     Colonic massage     Toilet positioning     Defecation dynamics     Sources of fiber     Return to intercourse/Dilator training     Sexual positioning     Lubricants/vaginal moisturizers     Vulvovaginal health/vaginal irritants     Body mechanics     Posture/ergonomics     Diaphragmatic breathing Prescribed as part of HEP, provided handout  Reviewed 1/23/23 2/8/23   Resources and technology     Other patient education       THERAPEUTIC EXERCISE: (30 minutes):    Exercises per grid below to improve mobility. Required minimal verbal cues to promote proper body mechanics and promote proper body breathing techniques. Progressed range and repetitions as indicated.    Date:  2/8/23 Date:  2/15/23 Date:  2/22/23   Activity/Exercise Parameters Parameters Parameters   Supine adductor stretch,  Elbows - mantis 3 x 30 sec     Lisette pose  3 x 30 sec  2 x 1 min   Piriformis stretch 3 x 30 sec 3 x 30 sec    Cat/cow  X 10 2 x 10    Quadruped rocking   With plyoball x 30 sec    Supine PF stretch  3 x  30 sec 3 x 30 sec   Supine DKTC  3 x  30 sec 3 x 30 sec   Seated hip ER - mobility   Ball walk-out x 10 x 10 sec   Supine Butterfly stretch   3 x 1 min, paired with diaphragmatic breathing      MANUAL THERAPY: (28 minutes): Soft tissue mobilization was utilized and necessary because of the patient's restricted motion of soft tissue. Date Type Location Comments   2/22/2023 Internal assessment/treatment Via vaginal canal STM through urogenital triangle, moriah-urethral tissues, LA muscles - SP applied     STM B adductors, lower abdomen, bladder mobilizations                                 (Used abbreviations: MET - muscle energy technique; SCS- Strain counter strain; CTM-Connective tissue mobilizations; CR- Contract/Relax; SP- Sustained pressure; PIT- Positional inhibition techniques; STM Soft -tissue mobilization; MM- Myofascial mobilization; TrP-Trigger point release; IASTM- Instrument assisted soft tissue mobilizations, TDN-Trigger point dry needling)    Pt gives verbal consent to internal vaginal assessment/treatment without chaperon present. Treatment/Session Summary:    Treatment Assessment: Pt continues to report mild to moderated discomfort with palpation of her PFM. She is showing improved coordination of PF drops through diaphragmatic breathing, which continues to reduce pain/discomfort during urination.    Communication/Consultation:  None today  Equipment provided today:  None  Recommendations/Intent for next treatment session: Next visit will focus on continue PF down-training    Total Treatment Billable Duration:  Treatment minutes 58  Time In: 0800  Time Out: 0900    Serge Virk PT       Charge Capture  }Post Session Pain  PT Visit Info  [employer] Portal  MD Guidelines  Scanned Media  Benefits  MyChart    Future Appointments   Date Time Provider Laura Marah   3/1/2023  8:00 AM Baljit johnson, PT [facility]   3/15/2023  8:00 AM Baljit johnson, PT [facility]   3/29/2023  8:00 AM Baljit johnson, PT [facility]   4/5/2023  8:00 AM Baljit johnson, PT [facility]   4/19/2023  8:00 AM Baljit johnson, PT [facility] ARIANNA

## 2023-03-15 ENCOUNTER — HOSPITAL ENCOUNTER (OUTPATIENT)
Dept: PHYSICAL THERAPY | Age: 33
Setting detail: RECURRING SERIES
Discharge: HOME | End: 2023-03-18
Payer: COMMERCIAL

## 2023-03-15 PROCEDURE — 97140 MANUAL THERAPY 1/> REGIONS: CPT

## 2023-03-15 PROCEDURE — 97110 THERAPEUTIC EXERCISES: CPT

## 2023-03-15 NOTE — PROGRESS NOTES
Yvonne Douglas  : 1990  Primary: Tyrell Klein ([address])  Secondary:  Liam Garcia @ [address] [address]  Phone: [phone]  Fax: [phone] Plan Frequency: 1x/week  No data recorded    PT Visit Info:  Plan Frequency: 1x/week      Visit Count:  5    OUTPATIENT PHYSICAL THERAPY:OP NOTE TYPE: Treatment Note 3/15/2023       Episode  }Appt Desk               Treatment Diagnosis:  Lack of coordination (muscle incoordination) (R27.8)  Pelvic floor dysfunction in female (M62.98)  Dyspareunia (N94.1)  Muscle spasm (M62.83)  Weakness of pelvic floor N81.89  Medical/Referring Diagnosis:  Weakness of pelvic floor [N81.89]  Referring Physician:  Gael Day DO MD Orders:  PT Eval and Treat   Date of Onset:  No data recorded   Allergies:   Hydromorphone  Restrictions/Precautions:  No data recorded  No data recorded   Interventions Planned (Treatment may consist of any combination of the following):    Current Treatment Recommendations: ROM; Strengthening; Neuromuscular re-education; Manual; Pain management; Home exercise program; Positioning; Therapeutic activities     Subjective Comments: Dysuria  Pt reports soreness following previous session which lasted 1 - 1.5 days described as cramping and aching. She has had a couple occassions she has noticed hesitancy with urination when on a high toilet. Initial:} 0    /10Post Session:   0     /10  Medications Last Reviewed:  3/15/2023  Updated Objective Findings  Mild discomfort reported with palpation of PFM.      Treatment   THERAPEUTIC ACTIVITY: (minutes): Functional activity education regarding anatomy, pathology and role of pelvic floor muscle (PFM) function in relation to presenting symptoms and role of pelvic floor therapy in conservative treatment., Functional activity education on avoiding bladder irritants, substitutions for common irritants, recommended fluid intake (types and spacing), appropriate voiding frequency, weight management and overall contributing factors of bowel health on bladder health/function in order to improve independent self management. Patient was provided a list of common bladder irritants including caffeine, carbonation, alcohol, artificial sweeteners, chocolate, acidic drinks, and tomato based drinks. , and Instruction on coordinated pelvic floor and diaphragmatic breathing to improve kinesthetic awareness of pelvic muscle mobility and restore proper motor recruitment patterns with breathing, posture, and functional movement (e.g. appropriate lift/contraction with increased IAP such as a cough, laugh, sneeze to prevent incontinence as well as appropriate relaxation/drop to reduce pain with vaginal penetration). TA Educational Topic Notes Date Completed   Pathology/Anatomy/PFM Function Completed 1/23/23   Bladder health education Completed 1/23/23   Urinary urge suppression     The mason     Voiding strategies Completed - PFM drops/relaxation, toilet positioning 1/23/23   Nocturia tips     Bowel/Bladder log     Bowel health education     Constipation care     Diarrhea/Fecal leakage     Colonic massage     Toilet positioning     Defecation dynamics     Sources of fiber     Return to intercourse/Dilator training     Sexual positioning     Lubricants/vaginal moisturizers     Vulvovaginal health/vaginal irritants     Body mechanics     Posture/ergonomics     Diaphragmatic breathing Prescribed as part of HEP, provided handout  Reviewed 1/23/23 2/8/23   Resources and technology     Other patient education       THERAPEUTIC EXERCISE: (30 minutes):    Exercises per grid below to improve mobility. Required minimal verbal cues to promote proper body mechanics and promote proper body breathing techniques. Progressed range and repetitions as indicated.    Date:  2/8/23 Date:  2/15/23 Date:  2/22/23 Date:  3/15/23   Activity/Exercise Parameters Parameters Parameters Parameters   Supine adductor stretch,  Elbows - mantis 3 x 30 sec      Lisette pose  3 x 30 sec  2 x 1 min 2 x 1 min   Piriformis stretch 3 x 30 sec 3 x 30 sec     Cat/cow  X 10 2 x 10  X 10    Quadruped rocking   With plyoball x 30 sec     Supine PF stretch  3 x  30 sec 3 x 30 sec X 2 min   Supine DKTC  3 x  30 sec 3 x 30 sec    Seated hip ER - mobility   Ball walk-out x 10 x 10 sec Ball walk-out x 10 x 10 sec   Supine Butterfly stretch   3 x 1 min, paired with diaphragmatic breathing 3 x 1 min, paired with diaphragmatic breathing   Hip flexor lunge stretch    X 45 sec x 2      MANUAL THERAPY: (25 minutes):   Soft tissue mobilization was utilized and necessary because of the patient's restricted motion of soft tissue.   Date Type Location Comments   3/15/2023 Internal assessment/treatment Via vaginal canal STM through urogenital triangle, moriah-urethral tissues, LA muscles - SP applied     STM B adductors                                 (Used abbreviations: MET - muscle energy technique; SCS- Strain counter strain; CTM-Connective tissue mobilizations; CR- Contract/Relax; SP- Sustained pressure; PIT- Positional inhibition techniques; STM Soft -tissue mobilization; MM- Myofascial mobilization; TrP-Trigger point release; IASTM- Instrument assisted soft tissue mobilizations, TDN-Trigger point dry needling)    Pt gives verbal consent to internal vaginal assessment/treatment without chaperon present.     Treatment/Session Summary:    Treatment Assessment: Pt with improved PFM range of motion and reduced discomfort to palpation of her PFM. She will likely be ready to progress to hip and gentle core strengthening next visit.   Communication/Consultation:  None today  Equipment provided today:  None  Recommendations/Intent for next treatment session: Next visit will focus on continue PF down-training, initiate hip strengthening    Total Treatment Billable Duration:  Treatment minutes 55  Time In: 0800  Time Out: 0900    Tania Medina, PT    Charge Capture  }Post Session Pain  PT Visit Info  [facility] Portal  MD Guidelines  Scanned Media  Benefits  MyChart    Future Appointments   Date Time Provider Laura Crystal   3/29/2023  8:00 AM Baljit johnson PT [facility]   4/5/2023  8:00 AM Baljit johnson PT [facility]   4/19/2023  8:00 AM Baljit johnson PT [facility] [place]

## 2023-03-29 ENCOUNTER — HOSPITAL ENCOUNTER (OUTPATIENT)
Dept: PHYSICAL THERAPY | Age: 33
Setting detail: RECURRING SERIES
Discharge: HOME | End: 2023-04-01
Payer: COMMERCIAL

## 2023-03-29 PROCEDURE — 97110 THERAPEUTIC EXERCISES: CPT

## 2023-03-29 PROCEDURE — 97140 MANUAL THERAPY 1/> REGIONS: CPT

## 2023-03-29 NOTE — PROGRESS NOTES
treatment session: Next visit will focus on continue PF down-training, continue hip and core strengthening.    Total Treatment Billable Duration:  Treatment minutes 55  Time In: 0800  Time Out: 0900    Vandana Pittman, PT       Charge Capture  }Post Session Pain  PT Visit Info  [facility] Portal  MD Guidelines  Scanned Media  Benefits  MyChart    Future Appointments   Date Time Provider Laura Crystal   4/5/2023  8:00 AM Vandana Pittman, PT [facility]   4/19/2023  8:00 AM Vandana Pittman, PT [facility]

## 2023-04-05 ENCOUNTER — HOSPITAL ENCOUNTER (OUTPATIENT)
Dept: PHYSICAL THERAPY | Age: 33
Setting detail: RECURRING SERIES
Discharge: HOME | End: 2023-04-08
Payer: COMMERCIAL

## 2023-04-05 PROCEDURE — 97110 THERAPEUTIC EXERCISES: CPT

## 2023-04-05 PROCEDURE — 97140 MANUAL THERAPY 1/> REGIONS: CPT

## 2023-04-05 ASSESSMENT — PAIN SCALES - GENERAL: PAINLEVEL_OUTOF10: 6

## 2023-04-05 NOTE — PROGRESS NOTES
Pancho Remedies  : 1990  Primary: Tyrell 4752 ([address])  Secondary:  [address] @ [facility] [address] [address]  Phone: [phone]  Fax: [phone] Plan Frequency: 1x/week  No data recorded    PT Visit Info:  Plan Frequency: 1x/week      Visit Count:  7    OUTPATIENT PHYSICAL THERAPY:OP NOTE TYPE: Treatment Note 2023       Episode  }Appt Desk               Treatment Diagnosis:  Lack of coordination (muscle incoordination) (R27.8)  Pelvic floor dysfunction in female (M62.98)  Dyspareunia (N94.1)  Muscle spasm (M62.83)  Weakness of pelvic floor N81.89  Medical/Referring Diagnosis:  Weakness of pelvic floor [N81.89]  Referring Physician:  Jacobo Avendano DO MD Orders:  PT Eval and Treat   Date of Onset:  No data recorded   Allergies:   Hydromorphone  Restrictions/Precautions:  No data recorded  No data recorded   Interventions Planned (Treatment may consist of any combination of the following):    Current Treatment Recommendations: ROM; Strengthening; Neuromuscular re-education; Manual; Pain management; Home exercise program; Positioning; Therapeutic activities     Subjective Comments: Dysuria  Pt with right lower abdominal pain/discomfort. States she she has not had a BM. No pain during urination. Initial:} 0   6/10 abdominal pain   Post Session:   0     /10  Medications reviewed:  2023  Updated Objective Findings  No pain reported with palpation of her PFM.     Treatment   THERAPEUTIC ACTIVITY: (minutes): Functional activity education regarding anatomy, pathology and role of pelvic floor muscle (PFM) function in relation to presenting symptoms and role of pelvic floor therapy in conservative treatment., Functional activity education on avoiding bladder irritants, substitutions for common irritants, recommended fluid intake (types and spacing), appropriate voiding frequency, weight management and overall contributing factors of bowel

## 2023-04-19 ENCOUNTER — HOSPITAL ENCOUNTER (OUTPATIENT)
Dept: PHYSICAL THERAPY | Age: 33
Setting detail: RECURRING SERIES
Discharge: HOME | End: 2023-04-22
Payer: COMMERCIAL

## 2023-04-19 PROCEDURE — 97110 THERAPEUTIC EXERCISES: CPT

## 2023-04-19 PROCEDURE — 97140 MANUAL THERAPY 1/> REGIONS: CPT

## 2023-04-19 NOTE — PROGRESS NOTES
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[employer])  Secondary:  [facility] [address] [address]  Phone: [phone]  Fax: [phone] Plan Frequency: 1x/week  No data recorded    PT Visit Info:  Plan Frequency: 1x/week      Visit Count:  8    OUTPATIENT PHYSICAL THERAPY:OP NOTE TYPE: Treatment Note 2023       Episode  }Appt Desk               Treatment Diagnosis:  Lack of coordination (muscle incoordination) (R27.8)  Pelvic floor dysfunction in female (M62.98)  Dyspareunia (N94.1)  Muscle spasm (M62.83)  Weakness of pelvic floor N81.89  Medical/Referring Diagnosis:  Weakness of pelvic floor [N81.89]  Referring Physician:  Geovany Nowak DO MD Orders:  PT Eval and Treat   Date of Onset:  No data recorded   Allergies:   Hydromorphone  Restrictions/Precautions:  No data recorded  No data recorded   Interventions Planned (Treatment may consist of any combination of the following):    Current Treatment Recommendations: ROM; Strengthening; Neuromuscular re-education; Manual; Pain management; Home exercise program; Positioning; Therapeutic activities     Subjective Comments: Dysuria    No pain to report. Initial:} 0    /10 abdominal pain   Post Session:   0     /10  Medications reviewed:  2023  Updated Objective Findings  No pain reported with palpation of her PFM. Treatment   THERAPEUTIC ACTIVITY: (minutes): Functional activity education regarding anatomy, pathology and role of pelvic floor muscle (PFM) function in relation to presenting symptoms and role of pelvic floor therapy in conservative treatment., Functional activity education on avoiding bladder irritants, substitutions for common irritants, recommended fluid intake (types and spacing), appropriate voiding frequency, weight management and overall contributing factors of bowel health on bladder health/function in order to improve independent self management.  Patient was

## 2023-04-26 ENCOUNTER — HOSPITAL ENCOUNTER (OUTPATIENT)
Dept: PHYSICAL THERAPY | Age: 33
Setting detail: RECURRING SERIES
Discharge: HOME | End: 2023-04-29
Payer: COMMERCIAL

## 2023-04-26 PROCEDURE — 97140 MANUAL THERAPY 1/> REGIONS: CPT

## 2023-04-26 PROCEDURE — 97110 THERAPEUTIC EXERCISES: CPT

## 2023-04-26 NOTE — PROGRESS NOTES
Mayo Carrier  : 1990  Primary: Bean Sinclair ([facility])  Secondary:  [facility] [address] [address]  Phone: [phone]  Fax: [phone] Plan Frequency: 1x/week  No data recorded    PT Visit Info:  Plan Frequency: 1x/week      Visit Count:  9    OUTPATIENT PHYSICAL THERAPY:OP NOTE TYPE: Treatment Note 2023       Episode  }Appt Desk               Treatment Diagnosis:  Lack of coordination (muscle incoordination) (R27.8)  Pelvic floor dysfunction in female (M62.98)  Dyspareunia (N94.1)  Muscle spasm (M62.83)  Weakness of pelvic floor N81.89  Medical/Referring Diagnosis:  Weakness of pelvic floor [N81.89]  Referring Physician:  Birgit San DO MD Orders:  PT Eval and Treat   Date of Onset:  No data recorded   Allergies:   Hydromorphone  Restrictions/Precautions:  No data recorded  No data recorded   Interventions Planned (Treatment may consist of any combination of the following):    Current Treatment Recommendations: ROM; Strengthening; Neuromuscular re-education; Manual; Pain management; Home exercise program; Positioning; Therapeutic activities     Subjective Comments: Dysuria    No pain to report. Initial:} 0    /10 abdominal pain   Post Session:   0     /10  Medications reviewed:  2023  Updated Objective Findings  See Discharge Summary    Treatment   THERAPEUTIC ACTIVITY: (minutes): Functional activity education regarding anatomy, pathology and role of pelvic floor muscle (PFM) function in relation to presenting symptoms and role of pelvic floor therapy in conservative treatment., Functional activity education on avoiding bladder irritants, substitutions for common irritants, recommended fluid intake (types and spacing), appropriate voiding frequency, weight management and overall contributing factors of bowel health on bladder health/function in order to improve independent self management.  Patient was provided a list of

## 2023-04-26 NOTE — THERAPY DISCHARGE
[]DNT       Coccygeus [] Right  [] Left  []DNT        External palpation:  Muscle/muscle group Tender? Adductors [] Right  [] Left  []DNT   Gluteals [] Right  [] Left  []DNT   Piriformis [] Right  [] Left  []DNT   Iliopsoas [] Right  [] Left  []DNT   Abdominal wall [] Right  [] Left  []DNT   Pubic symphysis [] Right  [] Left  []DNT     Breath assessment:  Observation: chest breathing dominant and A/P chest expansion  Coordination of pelvic floor muscles with breath: minimal pelvic floor muscle excursion  Co-contraction of PFM with transversus abdominis: present        ASSESSMENT   Initial Assessment:  Sid Olmos presents with musculoskeletal limitations including pelvic floor muscle (PFM) tension, reduced PFM Range of Motion (ROM), increased tenderness to palpation of the PFM, altered body mechanics, reduced coordination and awareness of PFM, abdominal soft tissue restrictions, poor diaphragm excursion, and decreased pelvic floor muscle (PFM) strength. These limitations are impairing the patient's ability to properly coordinate perineal elevation and descent for normalized PFM function, contributing to urinary dysfunction. As a result, she is limited in her/his ability to participate in pain-free urinary evacuation and pain free sexual intercourse. Problem List: (Impacting functional limitations): Body Structures, Functions, Activity Limitations Requiring Skilled Therapeutic Intervention: Decreased ROM; Decreased body mechanics; Increased pain; Decreased coordination     Therapy Prognosis:   Therapy Prognosis: Excellent     Initial Assessment Complexity:   Decision Making: Low Complexity    PLAN   Effective Dates: 1/23/23 TO   07/25/23   Frequency/Duration: Plan Frequency: 1x/week     Interventions Planned (Treatment may consist of any combination of the following):    Current Treatment Recommendations: ROM;  Strengthening; Neuromuscular re-education; Manual; Pain management; Home exercise program;

## 2023-10-10 ENCOUNTER — OFFICE VISIT (OUTPATIENT)
Dept: OBGYN CLINIC | Age: 33
End: 2023-10-10
Payer: COMMERCIAL

## 2023-10-10 VITALS
BODY MASS INDEX: 39.44 KG/M2 | SYSTOLIC BLOOD PRESSURE: 136 MMHG | HEIGHT: 64 IN | DIASTOLIC BLOOD PRESSURE: 74 MMHG | WEIGHT: 231 LBS

## 2023-10-10 DIAGNOSIS — N89.8 VAGINAL DISCHARGE: ICD-10-CM

## 2023-10-10 DIAGNOSIS — Z01.419 ENCOUNTER FOR ANNUAL ROUTINE GYNECOLOGICAL EXAMINATION: Primary | ICD-10-CM

## 2023-10-10 PROCEDURE — 99395 PREV VISIT EST AGE 18-39: CPT | Performed by: OBSTETRICS & GYNECOLOGY

## 2023-10-10 ASSESSMENT — PATIENT HEALTH QUESTIONNAIRE - PHQ9
SUM OF ALL RESPONSES TO PHQ QUESTIONS 1-9: 0
2. FEELING DOWN, DEPRESSED OR HOPELESS: 0
SUM OF ALL RESPONSES TO PHQ9 QUESTIONS 1 & 2: 0
1. LITTLE INTEREST OR PLEASURE IN DOING THINGS: 0
SUM OF ALL RESPONSES TO PHQ QUESTIONS 1-9: 0

## 2023-10-14 LAB
A VAGINAE DNA VAG QL NAA+PROBE: ABNORMAL SCORE
BVAB2 DNA VAG QL NAA+PROBE: ABNORMAL SCORE
C ALBICANS DNA VAG QL NAA+PROBE: NEGATIVE
C GLABRATA DNA VAG QL NAA+PROBE: NEGATIVE
MEGA1 DNA VAG QL NAA+PROBE: ABNORMAL SCORE
SPECIMEN SOURCE: ABNORMAL

## 2023-10-18 RX ORDER — METRONIDAZOLE 500 MG/1
500 TABLET ORAL 2 TIMES DAILY
Qty: 14 TABLET | Refills: 0 | Status: SHIPPED | OUTPATIENT
Start: 2023-10-18 | End: 2023-10-25

## 2023-10-25 RX ORDER — FLUCONAZOLE 150 MG/1
150 TABLET ORAL DAILY
Qty: 6 TABLET | Refills: 0 | Status: SHIPPED | OUTPATIENT
Start: 2023-10-25 | End: 2023-10-31

## 2024-03-26 ENCOUNTER — OFFICE VISIT (OUTPATIENT)
Dept: OBGYN CLINIC | Age: 34
End: 2024-03-26
Payer: COMMERCIAL

## 2024-03-26 VITALS
HEIGHT: 64 IN | DIASTOLIC BLOOD PRESSURE: 76 MMHG | WEIGHT: 236 LBS | SYSTOLIC BLOOD PRESSURE: 114 MMHG | BODY MASS INDEX: 40.29 KG/M2

## 2024-03-26 DIAGNOSIS — Z11.3 SCREEN FOR STD (SEXUALLY TRANSMITTED DISEASE): Primary | ICD-10-CM

## 2024-03-26 DIAGNOSIS — Z11.8 SCREENING EXAMINATION FOR PARASITIC INFECTION: ICD-10-CM

## 2024-03-26 DIAGNOSIS — Z11.3 SCREEN FOR STD (SEXUALLY TRANSMITTED DISEASE): ICD-10-CM

## 2024-03-26 LAB
HBV SURFACE AG SER QL: NONREACTIVE
HCV AB SER QL: NONREACTIVE
HIV 1+2 AB+HIV1 P24 AG SERPL QL IA: NONREACTIVE
HIV 1/2 RESULT COMMENT: NORMAL

## 2024-03-26 PROCEDURE — 99459 PELVIC EXAMINATION: CPT | Performed by: NURSE PRACTITIONER

## 2024-03-26 PROCEDURE — 99213 OFFICE O/P EST LOW 20 MIN: CPT | Performed by: NURSE PRACTITIONER

## 2024-03-26 RX ORDER — BIMATOPROST 3 UG/ML
SOLUTION TOPICAL
COMMUNITY
Start: 2024-02-06

## 2024-03-26 NOTE — PROGRESS NOTES
DROP TO UPPER EYELASH LINE OF EACH EYE EVERY NIGHT.      albuterol sulfate HFA (PROVENTIL;VENTOLIN;PROAIR) 108 (90 Base) MCG/ACT inhaler Inhale 2 puffs into the lungs every 4 hours as needed      ergocalciferol (ERGOCALCIFEROL) 1.25 MG (13080 UT) capsule Take 1 capsule by mouth every 7 days      escitalopram (LEXAPRO) 10 MG tablet Take 1 tablet by mouth      pantoprazole (PROTONIX) 40 MG tablet Take 1 tablet by mouth       No facility-administered encounter medications on file as of 3/26/2024.         Allergies   Allergen Reactions    Hydromorphone Itching    Naltrexone-Bupropion Hcl Er Nausea And Vomiting and Other (See Comments)         Family History   Problem Relation Age of Onset    Colon Cancer Paternal Aunt         50's    Colon Cancer Maternal Grandfather         60's    Diabetes Mother     Stroke Paternal Grandfather     Heart Disease Paternal Grandmother     Thyroid Disease Paternal Grandmother         hypothyroidism    Coronary Art Dis Paternal Grandmother     Hypertension Paternal Grandmother     Elevated Lipids Paternal Grandmother     Diabetes Paternal Grandmother     Thyroid Disease Maternal Grandmother     Diabetes Maternal Grandmother     Stomach Cancer Father     Alzheimer's Disease Maternal Grandmother          Social History     Socioeconomic History    Marital status: Single     Spouse name: None    Number of children: None    Years of education: None    Highest education level: None   Tobacco Use    Smoking status: Never    Smokeless tobacco: Never   Substance and Sexual Activity    Alcohol use: Yes    Drug use: No    Sexual activity: Yes     Partners: Male     Birth control/protection: Surgical     Comment: Hysterectomy   Social History Narrative    Patient states feeling safe at home, denies abuse.            ROS:  Review of Systems   Constitutional: Negative for chills, fever and weight loss.   HENT: Negative for hearing loss.   Eyes: Negative for blurred vision and double vision.

## 2024-03-27 LAB — RPR SER QL: NONREACTIVE

## 2024-03-29 ENCOUNTER — TELEPHONE (OUTPATIENT)
Dept: OBGYN CLINIC | Age: 34
End: 2024-03-29

## 2024-03-29 DIAGNOSIS — A59.9 TRICHOMONAS INFECTION: Primary | ICD-10-CM

## 2024-03-29 LAB
C TRACH RRNA SPEC QL NAA+PROBE: NEGATIVE
N GONORRHOEA RRNA SPEC QL NAA+PROBE: NEGATIVE
SPECIMEN SOURCE: ABNORMAL
T VAGINALIS RRNA SPEC QL NAA+PROBE: POSITIVE

## 2024-03-29 RX ORDER — METRONIDAZOLE 500 MG/1
500 TABLET ORAL 2 TIMES DAILY
Qty: 14 TABLET | Refills: 0 | Status: SHIPPED | OUTPATIENT
Start: 2024-03-29 | End: 2024-04-05

## 2024-03-29 NOTE — PROGRESS NOTES
Gc/C/Trich swab positive for Trich. Patient made aware of results. Rx Flagyl sent into pharmacy for treatment. Patient made aware to avoid alcohol while taking and for 3 days after last dose.     Will also schedule GERTRUDE in 3 months.

## 2024-03-29 NOTE — TELEPHONE ENCOUNTER
Patient called to notify of results. See progress note from me from today for full details. She is aware Rx Flagyl sent into pharmacy and will schedule retest in 3 months.

## 2024-03-29 NOTE — RESULT ENCOUNTER NOTE
Gc/C/Trich pos for Trichomonas. Patient is aware of results. This is a sexually transmitted infection. This can be treated with the following, if not allergic    Rx Flagyl 500mg PO BID for 7 days, #14, No Refills  sent into pharmacy for treatment.       Patient needs to notify partner so they can follow up with their PCP or [facility] for testing and/or treatment. Will schedule patient for retest in 3 months.

## 2024-07-16 ENCOUNTER — OFFICE VISIT (OUTPATIENT)
Dept: OBGYN CLINIC | Age: 34
End: 2024-07-16
Payer: COMMERCIAL

## 2024-07-16 VITALS
BODY MASS INDEX: 40.12 KG/M2 | HEIGHT: 64 IN | DIASTOLIC BLOOD PRESSURE: 74 MMHG | WEIGHT: 235 LBS | SYSTOLIC BLOOD PRESSURE: 112 MMHG

## 2024-07-16 DIAGNOSIS — A59.9 TRICHOMONAS INFECTION: Primary | ICD-10-CM

## 2024-07-16 DIAGNOSIS — Z11.3 SCREEN FOR STD (SEXUALLY TRANSMITTED DISEASE): ICD-10-CM

## 2024-07-16 PROCEDURE — 99459 PELVIC EXAMINATION: CPT | Performed by: NURSE PRACTITIONER

## 2024-07-16 PROCEDURE — 99213 OFFICE O/P EST LOW 20 MIN: CPT | Performed by: NURSE PRACTITIONER

## 2024-07-16 NOTE — PROGRESS NOTES
CC:   Chief Complaint   Patient presents with    Follow-up     GERTRUDE         HPI:    Moni  is a 34 y.o. , G0, No LMP recorded. Patient has had a hysterectomy., who is seen today for Trichomonas test of cure. Patient did test positive for Trichomonas on 3/29/24 and was treated with Flagyl which she did complete in its entirety.     Patient states she did complete Flagyl as prescribed. Patient unsure if partner was treated however, states she is no longer with him and has not had intercourse since diagnosis in March.      Denies fevers, pain, abnl vaginal DC, odor, itching/burning, etc.        S/p hysterectomy (2022) due to pelvic pain, dysmenorrhea, irregular menses, History of LEEP and History of cervical dysplasia.       Recommended use of condoms during intercourse and/or abstinence.        GYN HISTORY:  As per HPI     Hx STDs:  Hx Trichomonas in March 2023 and hx Chlamydia (in [facility])    Last Pap: 10/2021-ASCUS/+HPV prior to Hysterectomy       Current Outpatient Medications on File Prior to Visit   Medication Sig Dispense Refill    bimatoprost 0.03 % SOLN APPLY 1 DROP TO UPPER EYELASH LINE OF EACH EYE EVERY NIGHT.      albuterol sulfate HFA (PROVENTIL;VENTOLIN;PROAIR) 108 (90 Base) MCG/ACT inhaler Inhale 2 puffs into the lungs every 4 hours as needed      ergocalciferol (ERGOCALCIFEROL) 1.25 MG (42208 UT) capsule Take 1 capsule by mouth every 7 days      escitalopram (LEXAPRO) 10 MG tablet Take 1 tablet by mouth      pantoprazole (PROTONIX) 40 MG tablet Take 1 tablet by mouth       No current facility-administered medications on file prior to visit.         Past Medical History:   Diagnosis Date    Abnormal Papanicolaou smear of cervix     Abnormal vaginal Pap smear     Anxiety disorder     GERD (gastroesophageal reflux disease)     HPV in female 2019    Polycystic ovarian syndrome     Trichomonas infection     Vitamin D deficiency          Past Surgical History:   Procedure Laterality Date    LEEP      TL AND

## 2024-07-20 LAB
C TRACH RRNA SPEC QL NAA+PROBE: NEGATIVE
N GONORRHOEA RRNA SPEC QL NAA+PROBE: NEGATIVE
SPECIMEN SOURCE: NORMAL
T VAGINALIS RRNA SPEC QL NAA+PROBE: NEGATIVE

## 2024-10-11 ENCOUNTER — OFFICE VISIT (OUTPATIENT)
Dept: OBGYN CLINIC | Age: 34
End: 2024-10-11
Payer: COMMERCIAL

## 2024-10-11 VITALS
BODY MASS INDEX: 40.46 KG/M2 | HEIGHT: 64 IN | DIASTOLIC BLOOD PRESSURE: 60 MMHG | SYSTOLIC BLOOD PRESSURE: 112 MMHG | WEIGHT: 237 LBS

## 2024-10-11 DIAGNOSIS — Z11.3 SCREEN FOR STD (SEXUALLY TRANSMITTED DISEASE): ICD-10-CM

## 2024-10-11 DIAGNOSIS — Z01.419 WELL WOMAN EXAM WITH ROUTINE GYNECOLOGICAL EXAM: Primary | ICD-10-CM

## 2024-10-11 LAB
HBV SURFACE AG SER QL: NONREACTIVE
HCV AB SER QL: NONREACTIVE
HIV 1+2 AB+HIV1 P24 AG SERPL QL IA: NONREACTIVE
HIV 1/2 RESULT COMMENT: NORMAL
T PALLIDUM AB SER QL IA: NONREACTIVE

## 2024-10-11 PROCEDURE — 99395 PREV VISIT EST AGE 18-39: CPT | Performed by: OBSTETRICS & GYNECOLOGY

## 2024-10-11 NOTE — PROGRESS NOTES
Patient presents today for   Chief Complaint   Patient presents with    Annual Exam       LMP: No LMP recorded. Patient has had a hysterectomy.  Contraception: status post hysterectomy        Allergies   Allergen Reactions    Hydromorphone Itching    Naltrexone-Bupropion Hcl Er Nausea And Vomiting and Other (See Comments)     Current Outpatient Medications   Medication Sig Dispense Refill    bimatoprost 0.03 % SOLN APPLY 1 DROP TO UPPER EYELASH LINE OF EACH EYE EVERY NIGHT.      albuterol sulfate HFA (PROVENTIL;VENTOLIN;PROAIR) 108 (90 Base) MCG/ACT inhaler Inhale 2 puffs into the lungs every 4 hours as needed      ergocalciferol (ERGOCALCIFEROL) 1.25 MG (52996 UT) capsule Take 1 capsule by mouth every 7 days      escitalopram (LEXAPRO) 10 MG tablet Take 1 tablet by mouth      pantoprazole (PROTONIX) 40 MG tablet Take 1 tablet by mouth       No current facility-administered medications for this visit.     Past Medical History:   Diagnosis Date    Abnormal Papanicolaou smear of cervix     Abnormal vaginal Pap smear     Anxiety disorder     GERD (gastroesophageal reflux disease)     HPV in female 2019    Polycystic ovarian syndrome     Trichomonas infection     Vitamin D deficiency      Past Surgical History:   Procedure Laterality Date    LEEP      TLH AND BSO (CERVIX REMOVED)  04/14/2022     Social History     Socioeconomic History    Marital status: Single     Spouse name: Not on file    Number of children: Not on file    Years of education: Not on file    Highest education level: Not on file   Occupational History    Not on file   Tobacco Use    Smoking status: Never     Passive exposure: Never    Smokeless tobacco: Never   Vaping Use    Vaping status: Never Used   Substance and Sexual Activity    Alcohol use: Yes    Drug use: No    Sexual activity: Yes     Partners: Male     Birth control/protection: Surgical     Comment: Hysterectomy   Other Topics Concern    Not on file   Social History Narrative    Patient

## 2024-12-30 ENCOUNTER — APPOINTMENT (OUTPATIENT)
Dept: URBAN - METROPOLITAN AREA CLINIC 25 | Facility: CLINIC | Age: 34
Setting detail: DERMATOLOGY
End: 2024-12-30

## 2024-12-30 DIAGNOSIS — Z80.8 FAMILY HISTORY OF MALIGNANT NEOPLASM OF OTHER ORGANS OR SYSTEMS: ICD-10-CM

## 2024-12-30 DIAGNOSIS — Z71.89 OTHER SPECIFIED COUNSELING: ICD-10-CM

## 2024-12-30 DIAGNOSIS — D22 MELANOCYTIC NEVI: ICD-10-CM

## 2024-12-30 DIAGNOSIS — A63.0 ANOGENITAL (VENEREAL) WARTS: ICD-10-CM

## 2024-12-30 DIAGNOSIS — D18.0 HEMANGIOMA: ICD-10-CM

## 2024-12-30 DIAGNOSIS — L57.8 OTHER SKIN CHANGES DUE TO CHRONIC EXPOSURE TO NONIONIZING RADIATION: ICD-10-CM

## 2024-12-30 PROBLEM — D22.5 MELANOCYTIC NEVI OF TRUNK: Status: ACTIVE | Noted: 2024-12-30

## 2024-12-30 PROBLEM — D18.01 HEMANGIOMA OF SKIN AND SUBCUTANEOUS TISSUE: Status: ACTIVE | Noted: 2024-12-30

## 2024-12-30 PROCEDURE — ? COUNSELING

## 2024-12-30 PROCEDURE — ? ADDITIONAL NOTES

## 2024-12-30 PROCEDURE — 99213 OFFICE O/P EST LOW 20 MIN: CPT | Mod: 25

## 2024-12-30 PROCEDURE — 17110 DESTRUCTION B9 LES UP TO 14: CPT

## 2024-12-30 PROCEDURE — ? BENIGN DESTRUCTION

## 2024-12-30 ASSESSMENT — LOCATION ZONE DERM
LOCATION ZONE: LEG
LOCATION ZONE: TRUNK
LOCATION ZONE: VULVA
LOCATION ZONE: ARM

## 2024-12-30 ASSESSMENT — LOCATION DETAILED DESCRIPTION DERM
LOCATION DETAILED: LEFT PROXIMAL DORSAL FOREARM
LOCATION DETAILED: INFERIOR THORACIC SPINE
LOCATION DETAILED: RIGHT ANTERIOR PROXIMAL THIGH
LOCATION DETAILED: MONS PUBIS
LOCATION DETAILED: RIGHT PROXIMAL DORSAL FOREARM
LOCATION DETAILED: LEFT POSTERIOR SHOULDER
LOCATION DETAILED: UPPER STERNUM
LOCATION DETAILED: EPIGASTRIC SKIN
LOCATION DETAILED: RIGHT POSTERIOR SHOULDER
LOCATION DETAILED: RIGHT INGUINAL CREASE
LOCATION DETAILED: RIGHT SUPRAPUBIC SKIN

## 2024-12-30 ASSESSMENT — LOCATION SIMPLE DESCRIPTION DERM
LOCATION SIMPLE: RIGHT FOREARM
LOCATION SIMPLE: CHEST
LOCATION SIMPLE: LEFT SHOULDER
LOCATION SIMPLE: LEFT FOREARM
LOCATION SIMPLE: GROIN
LOCATION SIMPLE: RIGHT SHOULDER
LOCATION SIMPLE: UPPER BACK
LOCATION SIMPLE: RIGHT THIGH
LOCATION SIMPLE: ABDOMEN

## 2024-12-30 NOTE — PROCEDURE: ADDITIONAL NOTES
Additional Notes: hx of biopsy in area showing SK without verrucoid features.  Reviewed w pt that focal nature of multiple could suggest HPV-mediated, and that most SK-like lesions in genital area are 2/2 HPV.  S/p hysterectomy and up to date on paps.  Cryo today, advised recheck 2 mos
Detail Level: Simple
Render Risk Assessment In Note?: no

## 2024-12-30 NOTE — PROCEDURE: BENIGN DESTRUCTION
Render Note In Bullet Format When Appropriate: No
Treatment Number (Will Not Render If 0): 0
Detail Level: Detailed
Post-Care Instructions: I reviewed with the patient in detail post-care instructions. Patient is to wear sunprotection, and avoid picking at any of the treated lesions. Pt may apply Vaseline to crusted or scabbing areas.
Anesthesia Volume In Cc: 0.5
Consent: The patient's consent was obtained including but not limited to risks of crusting, scabbing, blistering, scarring, darker or lighter pigmentary change, recurrence, incomplete removal and infection.
Medical Necessity Information: It is in your best interest to select a reason for this procedure from the list below. All of these items fulfill various CMS LCD requirements except the new and changing color options.
Medical Necessity Clause: This procedure was medically necessary because the lesions that were treated were:

## 2024-12-30 NOTE — HPI: FULL BODY SKIN EXAMINATION
What Type Of Note Output Would You Prefer (Optional)?: Standard Output
What Is The Reason For Today's Visit?: Full Body Skin Examination
What Is The Reason For Today's Visit? (Being Monitored For X): concerning skin lesions on a periodic basis
Additional History: FBSE. Patient wants what she believes to be moles or SKs in groin checked for possible malignancy.

## 2025-03-06 ENCOUNTER — APPOINTMENT (OUTPATIENT)
Dept: URBAN - METROPOLITAN AREA CLINIC 23 | Facility: CLINIC | Age: 35
Setting detail: DERMATOLOGY
End: 2025-03-06

## 2025-03-06 DIAGNOSIS — A63.0 ANOGENITAL (VENEREAL) WARTS: ICD-10-CM | Status: IMPROVED

## 2025-03-06 PROCEDURE — ? COUNSELING

## 2025-03-06 PROCEDURE — 17110 DESTRUCTION B9 LES UP TO 14: CPT

## 2025-03-06 PROCEDURE — ? BENIGN DESTRUCTION

## 2025-03-06 PROCEDURE — ? PRESCRIPTION

## 2025-03-06 RX ORDER — IMIQUIMOD 12.5 MG/.25G
CREAM TOPICAL
Qty: 24 | Refills: 2 | Status: ERX | COMMUNITY
Start: 2025-03-06

## 2025-03-06 RX ADMIN — IMIQUIMOD: 12.5 CREAM TOPICAL at 00:00

## 2025-03-06 ASSESSMENT — LOCATION ZONE DERM
LOCATION ZONE: TRUNK
LOCATION ZONE: VULVA

## 2025-03-06 ASSESSMENT — LOCATION DETAILED DESCRIPTION DERM
LOCATION DETAILED: RIGHT SUPRAPUBIC SKIN
LOCATION DETAILED: MONS PUBIS

## 2025-03-06 ASSESSMENT — LOCATION SIMPLE DESCRIPTION DERM: LOCATION SIMPLE: GROIN

## 2025-05-10 ENCOUNTER — HOSPITAL ENCOUNTER (EMERGENCY)
Age: 35
Discharge: HOME | End: 2025-05-10
Payer: COMMERCIAL

## 2025-05-10 VITALS
HEIGHT: 64 IN | DIASTOLIC BLOOD PRESSURE: 78 MMHG | OXYGEN SATURATION: 97 % | SYSTOLIC BLOOD PRESSURE: 144 MMHG | TEMPERATURE: 97.9 F | HEART RATE: 98 BPM | WEIGHT: 225 LBS | RESPIRATION RATE: 18 BRPM | BODY MASS INDEX: 38.41 KG/M2

## 2025-05-10 DIAGNOSIS — S61.211A LACERATION OF LEFT INDEX FINGER WITHOUT FOREIGN BODY WITHOUT DAMAGE TO NAIL, INITIAL ENCOUNTER: Primary | ICD-10-CM

## 2025-05-10 PROCEDURE — 12001 RPR S/N/AX/GEN/TRNK 2.5CM/<: CPT

## 2025-05-10 PROCEDURE — 90471 IMMUNIZATION ADMIN: CPT | Performed by: STUDENT IN AN ORGANIZED HEALTH CARE EDUCATION/TRAINING PROGRAM

## 2025-05-10 PROCEDURE — 90714 TD VACC NO PRESV 7 YRS+ IM: CPT | Performed by: STUDENT IN AN ORGANIZED HEALTH CARE EDUCATION/TRAINING PROGRAM

## 2025-05-10 PROCEDURE — 6360000002 HC RX W HCPCS: Performed by: STUDENT IN AN ORGANIZED HEALTH CARE EDUCATION/TRAINING PROGRAM

## 2025-05-10 PROCEDURE — 99284 EMERGENCY DEPT VISIT MOD MDM: CPT

## 2025-05-10 RX ADMIN — CLOSTRIDIUM TETANI TOXOID ANTIGEN (FORMALDEHYDE INACTIVATED) AND CORYNEBACTERIUM DIPHTHERIAE TOXOID ANTIGEN (FORMALDEHYDE INACTIVATED) 0.5 ML: 5; 2 INJECTION, SUSPENSION INTRAMUSCULAR at 18:35

## 2025-05-10 ASSESSMENT — PAIN SCALES - GENERAL
PAINLEVEL_OUTOF10: 2
PAINLEVEL_OUTOF10: 2

## 2025-05-10 ASSESSMENT — ENCOUNTER SYMPTOMS
COUGH: 0
TROUBLE SWALLOWING: 0
ABDOMINAL PAIN: 0
SHORTNESS OF BREATH: 0
VOMITING: 0
FACIAL SWELLING: 0
PHOTOPHOBIA: 0

## 2025-05-10 ASSESSMENT — PAIN DESCRIPTION - LOCATION: LOCATION: FINGER (COMMENT WHICH ONE)

## 2025-05-10 ASSESSMENT — PAIN DESCRIPTION - ORIENTATION: ORIENTATION: LEFT

## 2025-05-10 ASSESSMENT — PAIN - FUNCTIONAL ASSESSMENT: PAIN_FUNCTIONAL_ASSESSMENT: 0-10

## 2025-05-10 NOTE — ED PROVIDER NOTES
Emergency Department Provider Note       PCP: Maurilio Pham MD   Age: 34 y.o.   Sex: female     DISPOSITION Discharge - Pending Orders Complete 05/10/2025 06:28:35 PM   DISPOSITION CONDITION Stable            ICD-10-CM    1. Laceration of left index finger without foreign body without damage to nail, initial encounter  S61.211A           Medical Decision Making     In summary this is a 34-year-old female patient presenting for evaluation with evidence of a very small simple laceration to the left volar distal index finger.  Based on my evaluation I feel the patient is at low risk for alternative diagnosis such as open fracture, retained foreign body, infection, tendon involvement.  Reasoning behind my decision making process is that the patient is grossly well-appearing on exam. Laceration was examined through full bloodless field without evidence of foreign body or tendon involvement. Given the mechanism of injury, my suspicion for fracture is low at this time. The patient does not have any loss of range of motion to suggest the tendon has been compromised.  T Given the mechanism and contamination level I do not feel antibiotics are warranted.  Wound was repaired with skin glue.  Tetanus shot updated today. I specifically counseled the patient on warning signs to return immediately for.  I specifically counseled the patient on signs of infection to be aware of. I also educated the patient on wound care. The patient verbalized understanding and is in agreement with the treatment plan.   1 acute, uncomplicated illness or injury.  Patient was discharged risks and benefits of hospitalization were considered.  ED attending physician present in department at time of care. Based on current hospital policy, their co-signature is not required on this note.  I independently ordered and reviewed each unique test.                     History     34-year-old female patient presents today complaining of laceration to her

## 2025-05-10 NOTE — ED TRIAGE NOTES
Pt was getting a razor blade out of her car when he cut her left index finger roughly 30 mins prior to arrival. Pt states she just can't get it it to stop bleeding. Unknown tetanus.

## 2025-05-10 NOTE — DISCHARGE INSTRUCTIONS
Keep the wound clean and dry leg several days.  The skin glue will fall off on its own over the next few days.  Monitor for signs of infection and return immediately notice any.    As we discussed, I did not find a life threatening cause of your symptoms today. However, THAT DOES NOT MEAN IT COULD NOT DEVELOP. If you develop ANY new or worsening symptoms, it is critical that you return for re-evaluation. This includes any symptoms that are concerning to you, especially symptoms such as decreased range of motion, fevers, swelling.  If you do not return for re-evaluation, you risk serious complications, including death.    It was my pleasure to take care of you today in the emergency department. Thank you for coming in today. I hope that we were able to help you out and make you more comfortable. I hope you have a speedy recovery! If you do get a survey in the mail asking how your care was, it really helps me and our department out if you respond. Thank you!

## (undated) DEVICE — POWDER HEMOSTAT GEL 3.0GR -- SURGICEL

## (undated) DEVICE — SOLUTION IV 250ML 0.9% SOD CHL CLR INJ FLX BG CONT PRT CLSR

## (undated) DEVICE — INSUFFLATION NEEDLE TO ESTABLISH PNEUMOPERITONEUM.: Brand: INSUFFLATION NEEDLE

## (undated) DEVICE — YANKAUER,BULB TIP,W/O VENT,RIGID,STERILE: Brand: MEDLINE

## (undated) DEVICE — GARMENT,MEDLINE,DVT,INT,CALF,MED, GEN2: Brand: MEDLINE

## (undated) DEVICE — SUTURE MCRYL SZ 4-0 L27IN ABSRB UD L19MM PS-2 1/2 CIR PRIM Y426H

## (undated) DEVICE — GYN LAPAROSCOPY: Brand: MEDLINE INDUSTRIES, INC.

## (undated) DEVICE — SOLUTION IV 1000ML 0.9% SOD CHL

## (undated) DEVICE — SOLUTION IRRIG 3000ML 0.9% SOD CHL FLX CONT 0797208] ICU MEDICAL INC]

## (undated) DEVICE — SUTURE ABSORBABLE BRAIDED 0 CT-1 8X27 IN UD VICRYL JJ41G

## (undated) DEVICE — 2, DISPOSABLE SUCTION/IRRIGATOR WITHOUT DISPOSABLE TIP: Brand: STRYKEFLOW

## (undated) DEVICE — TUBING, SUCTION, 1/4" X 10', STRAIGHT: Brand: MEDLINE

## (undated) DEVICE — LAPAROSCOPIC TROCAR SLEEVE/SINGLE USE: Brand: KII® OPTICAL ACCESS SYSTEM

## (undated) DEVICE — BUTTON SWITCH PENCIL BLADE ELECTRODE, HOLSTER: Brand: EDGE

## (undated) DEVICE — SUTURE VCRL SZ 0 L27IN ABSRB UD L36MM CT-1 1/2 CIR J260H

## (undated) DEVICE — 40585 XL ADVANCED TRENDELENBURG POSITIONING KIT: Brand: 40585 XL ADVANCED TRENDELENBURG POSITIONING KIT

## (undated) DEVICE — DRAPE SHT 3 QTR PROXIMA 53X77 --

## (undated) DEVICE — Device

## (undated) DEVICE — CANISTER, RIGID, 2000CC: Brand: MEDLINE INDUSTRIES, INC.

## (undated) DEVICE — SEALER LAP L37CM SHFT DIA10MM TISS FUS HAND/FOOT SWCH BLNT

## (undated) DEVICE — TROCAR: Brand: KII FIOS FIRST ENTRY

## (undated) DEVICE — CONTAINER SPEC HISTOLOGY 900ML POLYPR

## (undated) DEVICE — TOTAL TRAY, DB, 100% SILI FOLEY, 16FR 10: Brand: MEDLINE

## (undated) DEVICE — Z DUPLICATE USE 2847003 INJECTOR UTER

## (undated) DEVICE — DERMABOND SKIN ADH 0.7ML -- DERMABOND ADVANCED 12/BX

## (undated) DEVICE — SYRINGE IRRIG 60ML SFT PLIABLE BLB EZ TO GRP 1 HND USE W/

## (undated) DEVICE — TROCAR: Brand: KII® SLEEVE